# Patient Record
Sex: FEMALE | Race: WHITE | NOT HISPANIC OR LATINO | Employment: UNEMPLOYED | URBAN - METROPOLITAN AREA
[De-identification: names, ages, dates, MRNs, and addresses within clinical notes are randomized per-mention and may not be internally consistent; named-entity substitution may affect disease eponyms.]

---

## 2024-02-12 ENCOUNTER — LAB VISIT (OUTPATIENT)
Dept: LAB | Facility: HOSPITAL | Age: 59
End: 2024-02-12
Attending: PODIATRIST
Payer: COMMERCIAL

## 2024-02-12 ENCOUNTER — OFFICE VISIT (OUTPATIENT)
Dept: PODIATRY | Facility: CLINIC | Age: 59
End: 2024-02-12
Payer: COMMERCIAL

## 2024-02-12 VITALS
RESPIRATION RATE: 16 BRPM | WEIGHT: 169.19 LBS | BODY MASS INDEX: 29.98 KG/M2 | HEIGHT: 63 IN | SYSTOLIC BLOOD PRESSURE: 111 MMHG | DIASTOLIC BLOOD PRESSURE: 70 MMHG | HEART RATE: 91 BPM

## 2024-02-12 DIAGNOSIS — M25.879: Primary | ICD-10-CM

## 2024-02-12 DIAGNOSIS — Z01.818 PRE-OP EXAM: ICD-10-CM

## 2024-02-12 LAB
ALBUMIN SERPL BCP-MCNC: 3.7 G/DL (ref 3.5–5.2)
ALP SERPL-CCNC: 105 U/L (ref 55–135)
ALT SERPL W/O P-5'-P-CCNC: 46 U/L (ref 10–44)
ANION GAP SERPL CALC-SCNC: 10 MMOL/L (ref 8–16)
AST SERPL-CCNC: 37 U/L (ref 10–40)
BASOPHILS # BLD AUTO: 0.03 K/UL (ref 0–0.2)
BASOPHILS NFR BLD: 0.5 % (ref 0–1.9)
BILIRUB SERPL-MCNC: 0.2 MG/DL (ref 0.1–1)
BUN SERPL-MCNC: 23 MG/DL (ref 6–20)
CALCIUM SERPL-MCNC: 9.5 MG/DL (ref 8.7–10.5)
CHLORIDE SERPL-SCNC: 104 MMOL/L (ref 95–110)
CO2 SERPL-SCNC: 28 MMOL/L (ref 23–29)
CREAT SERPL-MCNC: 0.8 MG/DL (ref 0.5–1.4)
DIFFERENTIAL METHOD BLD: ABNORMAL
EOSINOPHIL # BLD AUTO: 0.1 K/UL (ref 0–0.5)
EOSINOPHIL NFR BLD: 1.5 % (ref 0–8)
ERYTHROCYTE [DISTWIDTH] IN BLOOD BY AUTOMATED COUNT: 13.2 % (ref 11.5–14.5)
EST. GFR  (NO RACE VARIABLE): >60 ML/MIN/1.73 M^2
GLUCOSE SERPL-MCNC: 90 MG/DL (ref 70–110)
HCT VFR BLD AUTO: 38.7 % (ref 37–48.5)
HGB BLD-MCNC: 12.6 G/DL (ref 12–16)
IMM GRANULOCYTES # BLD AUTO: 0.02 K/UL (ref 0–0.04)
IMM GRANULOCYTES NFR BLD AUTO: 0.3 % (ref 0–0.5)
LYMPHOCYTES # BLD AUTO: 2.2 K/UL (ref 1–4.8)
LYMPHOCYTES NFR BLD: 36.5 % (ref 18–48)
MCH RBC QN AUTO: 32.4 PG (ref 27–31)
MCHC RBC AUTO-ENTMCNC: 32.6 G/DL (ref 32–36)
MCV RBC AUTO: 100 FL (ref 82–98)
MONOCYTES # BLD AUTO: 0.3 K/UL (ref 0.3–1)
MONOCYTES NFR BLD: 5.2 % (ref 4–15)
NEUTROPHILS # BLD AUTO: 3.3 K/UL (ref 1.8–7.7)
NEUTROPHILS NFR BLD: 56 % (ref 38–73)
NRBC BLD-RTO: 0 /100 WBC
PLATELET # BLD AUTO: 212 K/UL (ref 150–450)
PMV BLD AUTO: 8.4 FL (ref 9.2–12.9)
POTASSIUM SERPL-SCNC: 4.1 MMOL/L (ref 3.5–5.1)
PROT SERPL-MCNC: 6.8 G/DL (ref 6–8.4)
RBC # BLD AUTO: 3.89 M/UL (ref 4–5.4)
SODIUM SERPL-SCNC: 142 MMOL/L (ref 136–145)
WBC # BLD AUTO: 5.92 K/UL (ref 3.9–12.7)

## 2024-02-12 PROCEDURE — 3008F BODY MASS INDEX DOCD: CPT | Mod: CPTII,S$GLB,, | Performed by: PODIATRIST

## 2024-02-12 PROCEDURE — 1159F MED LIST DOCD IN RCRD: CPT | Mod: CPTII,S$GLB,, | Performed by: PODIATRIST

## 2024-02-12 PROCEDURE — 1160F RVW MEDS BY RX/DR IN RCRD: CPT | Mod: CPTII,S$GLB,, | Performed by: PODIATRIST

## 2024-02-12 PROCEDURE — 36415 COLL VENOUS BLD VENIPUNCTURE: CPT | Mod: XB | Performed by: PODIATRIST

## 2024-02-12 PROCEDURE — 3074F SYST BP LT 130 MM HG: CPT | Mod: CPTII,S$GLB,, | Performed by: PODIATRIST

## 2024-02-12 PROCEDURE — 85025 COMPLETE CBC W/AUTO DIFF WBC: CPT | Performed by: PODIATRIST

## 2024-02-12 PROCEDURE — 99205 OFFICE O/P NEW HI 60 MIN: CPT | Mod: S$GLB,,, | Performed by: PODIATRIST

## 2024-02-12 PROCEDURE — 3078F DIAST BP <80 MM HG: CPT | Mod: CPTII,S$GLB,, | Performed by: PODIATRIST

## 2024-02-12 PROCEDURE — 80053 COMPREHEN METABOLIC PANEL: CPT | Performed by: PODIATRIST

## 2024-02-12 PROCEDURE — 99999 PR PBB SHADOW E&M-EST. PATIENT-LVL V: CPT | Mod: PBBFAC,,, | Performed by: PODIATRIST

## 2024-02-12 RX ORDER — CLONAZEPAM 0.5 MG/1
0.5 TABLET ORAL 2 TIMES DAILY PRN
COMMUNITY
End: 2024-02-12 | Stop reason: ALTCHOICE

## 2024-02-12 RX ORDER — AMOXICILLIN AND CLAVULANATE POTASSIUM 875; 125 MG/1; MG/1
TABLET, FILM COATED ORAL
COMMUNITY
Start: 2023-09-10 | End: 2024-02-12

## 2024-02-12 RX ORDER — DESVENLAFAXINE 100 MG/1
100 TABLET, EXTENDED RELEASE ORAL
COMMUNITY
End: 2024-05-22

## 2024-02-12 RX ORDER — DEXTROAMPHETAMINE SACCHARATE, AMPHETAMINE ASPARTATE MONOHYDRATE, DEXTROAMPHETAMINE SULFATE AND AMPHETAMINE SULFATE 5; 5; 5; 5 MG/1; MG/1; MG/1; MG/1
20 CAPSULE, EXTENDED RELEASE ORAL EVERY MORNING
COMMUNITY
End: 2024-02-12

## 2024-02-13 NOTE — PROGRESS NOTES
Subjective:       Patient ID: Kathy Hays is a 59 y.o. female.    Chief Complaint: Foot Problem, Foot Pain, Numbness, and Nail Problem  Patient presents today for a new patient evaluation she complains of foot pain bilateral including numbness of the right great toe and toenail fungus on the right foot.  Patient states the right foot is a lot worse than the left.  Patient states that she had developed a soft tissue mass initially on her right foot a proximally 30 years ago she states when she had seen a provider about the mass talked about having to go through the top of her foot to get to the mass which would cause nerve damage and did not recommend surgery at that time she had also seen another provider regarding her contracted digits on her right foot.  Patient states the 3 areas on her right foot have gotten progressively bigger she also has 1 area on the left.  Patient does have x-rays in her chart from 2023 right foot.  Patient indicates she had actually put off having the right foot addressed because of lack of insurance and she had been going through a divorce she recently has gotten insurance and states she needs to have this taking care of as it is significantly affecting her ability to ambulate and activity level.    Past Medical History:   Diagnosis Date    ADHD     Depression      Past Surgical History:   Procedure Laterality Date     SECTION      FOOT SURGERY Right     SINUS SURGERY       History reviewed. No pertinent family history.  Social History     Socioeconomic History    Marital status:    Tobacco Use    Smoking status: Never    Smokeless tobacco: Never   Substance and Sexual Activity    Alcohol use: Never    Drug use: Not Currently       Current Outpatient Medications   Medication Sig Dispense Refill    clonazePAM (KLONOPIN) 1 MG tablet Take 1 mg by mouth every evening.      desvenlafaxine succinate (PRISTIQ) 100 MG Tb24 Take 100 mg by  "mouth.       No current facility-administered medications for this visit.     Review of patient's allergies indicates:   Allergen Reactions    Codeine Anaphylaxis    Sulfa (sulfonamide antibiotics) Anaphylaxis       Review of Systems   Musculoskeletal:  Positive for arthralgias, gait problem and joint swelling.   Skin:  Positive for color change.   All other systems reviewed and are negative.      Objective:      Vitals:    02/12/24 1047   BP: 111/70   Pulse: 91   Resp: 16   Weight: 76.7 kg (169 lb 3.2 oz)   Height: 5' 3" (1.6 m)     Physical Exam  Vitals and nursing note reviewed. Exam conducted with a chaperone present.   Constitutional:       Appearance: Normal appearance.   Cardiovascular:      Pulses:           Dorsalis pedis pulses are 2+ on the right side and 2+ on the left side.        Posterior tibial pulses are 1+ on the right side and 1+ on the left side.   Pulmonary:      Effort: Pulmonary effort is normal.   Musculoskeletal:         General: Swelling, tenderness and deformity present.      Right foot: Decreased range of motion. Deformity and prominent metatarsal heads present.      Left foot: Deformity and prominent metatarsal heads present.        Feet:    Feet:      Right foot:      Protective Sensation: 4 sites tested.  2 sites sensed.      Skin integrity: Erythema and warmth present.      Toenail Condition: Right toenails are abnormally thick. Fungal disease present.     Left foot:      Protective Sensation: 4 sites tested.  3 sites sensed.      Skin integrity: Erythema present.   Skin:     Capillary Refill: Capillary refill takes 2 to 3 seconds.      Findings: Erythema present.   Neurological:      Mental Status: She is alert.      Sensory: Sensory deficit present.   Psychiatric:         Mood and Affect: Mood normal.         Behavior: Behavior normal.                        Assessment:       1. Mass of joint of foot    2. Pre-op exam        Plan:       Patient presents today for a new patient " evaluation she complains of foot pain bilateral including numbness of the right great toe and toenail fungus on the right foot.  Patient states the right foot is a lot worse than the left.  Patient states that she had developed a soft tissue mass initially on her right foot a proximally 30 years ago she states when she had seen a provider about the mass talked about having to go through the top of her foot to get to the mass which would cause nerve damage and did not recommend surgery at that time she had also seen another provider regarding her contracted digits on her right foot.  Patient states the 3 areas on her right foot have gotten progressively bigger she also has 1 area on the left.  Patient does have x-rays in her chart from December of 2023 right foot.  A comprehensive new patient evaluation was performed today patient was accompanied by her boyfriend.  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 patient has a smaller mass sub 1st MPJ right that is a proximally 3.5 cm long by 3 cm wide this is less firm it is movable most likely consistent with a lipoma an additional mass was present underlying the 2nd and 3rd metatarsal region proximal to the digits on the plantar aspect of the right foot this is a proximally 4 cm long by 3.5 cm wide it is also not firm it is softer in nature movable most likely consistent with a lipoma however all 3 masses could possibly be lipoma and or fibroma patient has a mass on the left foot proximal to the base of the 2nd and 3rd digits this mass is 3.5 cm long by 3 cm wide.  This mass is not firm it is movable most likely consistent with lipoma however fibroma can not be excluded.  Plain film x-rays were evaluated patient has what appears to be an old fracture of the 4th metatarsal with lateral deviation however it is completely healed there is  osteoporotic bone appreciated on plain film x-rays with diffuse degenerative changes right.  Patient indicated today she is very limited as to the shoes that she can wear she feels as if she needs arch support she does have elevated arches on both feet however when she wears an aggressive arch support it rubs and irritates the largest mass that is in the arch region on the right foot so she was wearing a very flat shoe today she also states she feels as if she has been compensating putting more pressure on her left foot because of the discomfort on the right patient has considerable numbness and loss of sensation in the right hallux most likely due to impingement of the soft tissue mass on the plantar aspect of the right foot.  Patient has fungally infected nails with dystrophy discoloration and thickening digits 1 through 5 right I have recommended the application of Vicks vapor rub twice a day every day advising the patient this will help to address the fungal involvement the pertinent fungal involvement on the right foot is not noted on the left.  Patient understands surgical excision of the soft tissue masses is likely not going to restore the loss of sensation and the loss of range of motion on the right foot as this is chronic in nature patient also understands this is an extensive surgical procedure with 3 likely plantar incisions which will require 4 weeks of complete nonweightbearing followed by several weeks of partial weight-bearing in a fracture boot before the patient will be able to transition to normal shoes with increasing activity.  Patient was in understanding and agreement with everything discussed clearly the right foot as much worse than the left and needs to be addressed initially we have tentatively schedule the patient's surgery for February the 23rd 2024 patient will be seen prior to this time for a preoperative history and physical she has been advised to contact us with any problems  questions or concerns prior to surgery patient understands she will need to utilize a knee scooter postoperatively to maintain her nonweightbearing status on the right foot.  I did advised the patient it has not very likely that any of these lesions or cancerous however they will be sent to pathology for further evaluation at the time of surgical excision.  Patient informed that she should contact us if she has any problems questions or concerns prior to her preoperative evaluation.  Preoperative lab work has been ordered will be reviewed prior to surgery.  Patient understands there has always a possibility of recurrence even with the surgical excision of the soft tissue mass x3.  X-ray evaluation today was performed on x-rays that were taken in December of 2023 new x-rays are not currently needed at this time as this is soft tissue related deformity.  Patient understands risks involved with this type of procedure.This note was created using [employer] voice recognition software that occasionally misinterpreted phrases or words.

## 2024-02-19 ENCOUNTER — TELEPHONE (OUTPATIENT)
Dept: PODIATRY | Facility: CLINIC | Age: 59
End: 2024-02-19
Payer: COMMERCIAL

## 2024-02-19 NOTE — TELEPHONE ENCOUNTER
LVM for patient to return call advising we need to know if she will be having surgery this week. Cancelled pre op appt over the weekend.

## 2024-05-02 ENCOUNTER — TELEPHONE (OUTPATIENT)
Dept: PODIATRY | Facility: CLINIC | Age: 59
End: 2024-05-02
Payer: COMMERCIAL

## 2024-05-02 NOTE — TELEPHONE ENCOUNTER
----- Message from Wilber Nicole DPM sent at 5/2/2024 12:17 PM CDT -----  Contact: PT  6/14 let me know if this works.  ----- Message -----  From: Lisa Haji LPN  Sent: 5/2/2024  12:00 PM CDT  To: Wilber Nicole DPM    Patient cancelled surgery in February and is now wanting to schedule surgery again. Please advise.  ----- Message -----  From: Korina Mota  Sent: 5/2/2024  11:29 AM CDT  To: [facility] Staff    Type: Needs Medical Advice    Who Called: PT  Best Call Back Number: [phone]  Additional  Information: PT requesting a call back to reschedule missed appt for EXCISION, MASS, FOOT. Would like to have it asap, Pt states she getting to where she can't walk and in a lot of pain.   Please advise- Thank you

## 2024-05-10 ENCOUNTER — TELEPHONE (OUTPATIENT)
Dept: PODIATRY | Facility: CLINIC | Age: 59
End: 2024-05-10
Payer: COMMERCIAL

## 2024-05-10 ENCOUNTER — ANESTHESIA EVENT (OUTPATIENT)
Dept: SURGERY | Facility: HOSPITAL | Age: 59
End: 2024-05-10
Payer: COMMERCIAL

## 2024-05-10 DIAGNOSIS — M25.879: Primary | ICD-10-CM

## 2024-05-10 DIAGNOSIS — R22.41 MASS OF RIGHT FOOT: ICD-10-CM

## 2024-05-10 RX ORDER — SODIUM CHLORIDE, SODIUM LACTATE, POTASSIUM CHLORIDE, CALCIUM CHLORIDE 600; 310; 30; 20 MG/100ML; MG/100ML; MG/100ML; MG/100ML
INJECTION, SOLUTION INTRAVENOUS CONTINUOUS
Status: CANCELLED | OUTPATIENT
Start: 2024-05-10

## 2024-05-10 RX ORDER — LIDOCAINE HYDROCHLORIDE 10 MG/ML
1 INJECTION, SOLUTION EPIDURAL; INFILTRATION; INTRACAUDAL; PERINEURAL ONCE
Status: CANCELLED | OUTPATIENT
Start: 2024-05-10 | End: 2024-05-10

## 2024-05-10 NOTE — TELEPHONE ENCOUNTER
----- Message from Sweetie Lomeli sent at 5/10/2024  8:24 AM CDT -----  Contact: PT  Type:  Patient Returning Call    Who Called:PT   Who Left Message for Patient:FELIX  Does the patient know what this is regarding?:SOONER APPT   Would the patient rather a call back or a response via [facility]? CALL   Best Call Back Number: TEMP #: [phone]  Additional Information: THANK YOU

## 2024-05-10 NOTE — ANESTHESIA PREPROCEDURE EVALUATION
05/10/2024  Kathy Hays is a 59 y.o., female.      Pre-op Assessment    I have reviewed the Patient Summary Reports.     I have reviewed the Nursing Notes. I have reviewed the NPO Status.   I have reviewed the Medications.     Review of Systems  Anesthesia Hx:  No problems with previous Anesthesia  C/S  FESS  R foot fx  Pilonidal  T&A              Social:  Non-Smoker       Hematology/Oncology:  Hematology Normal   Oncology Normal                                   EENT/Dental:  EENT/Dental Normal           Cardiovascular:  Cardiovascular Normal                                            Pulmonary:  Pulmonary Normal                       Renal/:  Renal/ Normal                 Hepatic/GI:  Hepatic/GI Normal       ALT 46  isolated elevated enzyme          Musculoskeletal:  Musculoskeletal Normal                Neurological:      Headaches     migraines                            Endocrine:  Endocrine Normal    A1C 5.2        Dermatological:  Skin Normal    Psych:   anxiety depression ADHD               Physical Exam  General: Well nourished, Cooperative, Alert and Oriented    Airway:  Mallampati: I   Mouth Opening: Normal  TM Distance: Normal  Neck ROM: Normal ROM    Dental:  Intact  Intact.  None loose per pt report  Chest/Lungs:  Clear to auscultation, Normal Respiratory Rate    Heart:  Rate: Normal  Rhythm: Regular Rhythm  Sounds: Normal        Anesthesia Plan  Type of Anesthesia, risks & benefits discussed:    Anesthesia Type: MAC, Gen Supraglottic Airway  Intra-op Monitoring Plan: Standard ASA Monitors  Post Op Pain Control Plan: multimodal analgesia  Induction:  IV  Airway Plan: Direct, Post-Induction  Informed Consent: Informed consent signed with the Patient and all parties understand the risks and agree with anesthesia plan.  All questions answered. Patient consented to  blood products? No  ASA Score: 2  Day of Surgery Review of History & Physical: I have interviewed and examined the patient. I have reviewed the patient's H&P dated: There are no significant changes.   Anesthesia Plan Notes: NPO after 2300-CL    Ready For Surgery From Anesthesia Perspective.     .

## 2024-05-13 ENCOUNTER — PATIENT MESSAGE (OUTPATIENT)
Dept: PODIATRY | Facility: CLINIC | Age: 59
End: 2024-05-13
Payer: COMMERCIAL

## 2024-05-21 ENCOUNTER — PATIENT MESSAGE (OUTPATIENT)
Dept: PODIATRY | Facility: CLINIC | Age: 59
End: 2024-05-21
Payer: COMMERCIAL

## 2024-05-22 ENCOUNTER — OFFICE VISIT (OUTPATIENT)
Dept: PODIATRY | Facility: CLINIC | Age: 59
End: 2024-05-22
Payer: COMMERCIAL

## 2024-05-22 VITALS — HEIGHT: 63 IN | BODY MASS INDEX: 29.95 KG/M2 | WEIGHT: 169.06 LBS

## 2024-05-22 DIAGNOSIS — M79.2 NEURITIS: ICD-10-CM

## 2024-05-22 DIAGNOSIS — M25.879: Primary | ICD-10-CM

## 2024-05-22 PROCEDURE — 1160F RVW MEDS BY RX/DR IN RCRD: CPT | Mod: CPTII,S$GLB,, | Performed by: PODIATRIST

## 2024-05-22 PROCEDURE — 1159F MED LIST DOCD IN RCRD: CPT | Mod: CPTII,S$GLB,, | Performed by: PODIATRIST

## 2024-05-22 PROCEDURE — 3008F BODY MASS INDEX DOCD: CPT | Mod: CPTII,S$GLB,, | Performed by: PODIATRIST

## 2024-05-22 PROCEDURE — 99214 OFFICE O/P EST MOD 30 MIN: CPT | Mod: 25,S$GLB,, | Performed by: PODIATRIST

## 2024-05-22 PROCEDURE — 96372 THER/PROPH/DIAG INJ SC/IM: CPT | Mod: S$GLB,,, | Performed by: PODIATRIST

## 2024-05-22 PROCEDURE — 99999 PR PBB SHADOW E&M-EST. PATIENT-LVL III: CPT | Mod: PBBFAC,,, | Performed by: PODIATRIST

## 2024-05-22 RX ORDER — BUPROPION HYDROCHLORIDE 150 MG/1
150 TABLET ORAL
COMMUNITY
Start: 2024-05-10 | End: 2024-06-10

## 2024-05-22 RX ORDER — TRIAMCINOLONE ACETONIDE 40 MG/ML
80 INJECTION, SUSPENSION INTRA-ARTICULAR; INTRAMUSCULAR
Status: COMPLETED | OUTPATIENT
Start: 2024-05-22 | End: 2024-05-22

## 2024-05-22 RX ORDER — KETOROLAC TROMETHAMINE 30 MG/ML
60 INJECTION, SOLUTION INTRAMUSCULAR; INTRAVENOUS
Status: COMPLETED | OUTPATIENT
Start: 2024-05-22 | End: 2024-05-22

## 2024-05-22 RX ORDER — METHYLPREDNISOLONE 4 MG/1
TABLET ORAL
Qty: 21 EACH | Refills: 0 | Status: SHIPPED | OUTPATIENT
Start: 2024-05-22 | End: 2024-06-10

## 2024-05-22 RX ORDER — MIRTAZAPINE 15 MG/1
TABLET, FILM COATED ORAL
COMMUNITY
Start: 2024-04-11 | End: 2024-06-10

## 2024-05-22 RX ADMIN — TRIAMCINOLONE ACETONIDE 80 MG: 40 INJECTION, SUSPENSION INTRA-ARTICULAR; INTRAMUSCULAR at 02:05

## 2024-05-22 RX ADMIN — KETOROLAC TROMETHAMINE 60 MG: 30 INJECTION, SOLUTION INTRAMUSCULAR; INTRAVENOUS at 02:05

## 2024-05-25 NOTE — PROGRESS NOTES
Subjective:       Patient ID: Kathy Hays is a 59 y.o. female.    Chief Complaint: Foot Pain (Right foot)  Patient presents today for a follow-up patient evaluation she complains of foot pain bilateral including numbness of the right great toe and toenail fungus on the right foot.  Patient states the right foot is a lot worse than the left.  Patient states that she had developed a soft tissue mass initially on her right foot a proximally 30 years ago she states when she had seen a provider about the mass talked about having to go through the top of her foot to get to the mass which would cause nerve damage and did not recommend surgery at that time she had also seen another provider regarding her contracted digits on her right foot.  Patient states the 3 areas on her right foot have gotten progressively bigger she also has 1 area on the left.  Patient does have x-rays in her chart from 2023 right foot.  Patient indicates she had actually put off having the right foot addressed because of lack of insurance and she had been going through a divorce she recently has gotten insurance and states she needs to have this taking care of as it is significantly affecting her ability to ambulate and activity level.    Past Medical History:   Diagnosis Date    ADHD     Depression      Past Surgical History:   Procedure Laterality Date     SECTION      FOOT SURGERY Right     SINUS SURGERY       No family history on file.  Social History     Socioeconomic History    Marital status:    Tobacco Use    Smoking status: Never    Smokeless tobacco: Never   Substance and Sexual Activity    Alcohol use: Never    Drug use: Not Currently   Social History Narrative    ** Merged History Encounter **            Current Outpatient Medications   Medication Sig Dispense Refill    buPROPion (WELLBUTRIN XL) 150 MG TB24 tablet Take 150 mg by mouth.      clonazePAM (KLONOPIN) 1 MG tablet Take 1  "mg by mouth every evening.      fluticasone propionate (FLONASE) 50 mcg/actuation nasal spray 1 spray (50 mcg total) by Each Nostril route once daily. 18 g 0    mirtazapine (REMERON) 15 MG tablet Take by mouth.      methylPREDNISolone (MEDROL DOSEPACK) 4 mg tablet use as directed 21 each 0     No current facility-administered medications for this visit.     Review of patient's allergies indicates:   Allergen Reactions    Codeine Anaphylaxis     Need clarification.  Anaphylaxis unlikely    Sulfa (sulfonamide antibiotics) Anaphylaxis     Need clarification.  Anaphylaxis unlikely    Hydrocodone-acetaminophen      Other reaction(s): Nausea       Review of Systems   Musculoskeletal:  Positive for arthralgias, gait problem and joint swelling.   Skin:  Positive for color change.   All other systems reviewed and are negative.      Objective:      Vitals:    05/22/24 1343   Weight: 76.7 kg (169 lb 1.5 oz)   Height: 5' 3" (1.6 m)     Physical Exam  Vitals and nursing note reviewed. Exam conducted with a chaperone present.   Constitutional:       Appearance: Normal appearance.   Cardiovascular:      Pulses:           Dorsalis pedis pulses are 2+ on the right side and 2+ on the left side.        Posterior tibial pulses are 1+ on the right side and 1+ on the left side.   Pulmonary:      Effort: Pulmonary effort is normal.   Musculoskeletal:         General: Swelling, tenderness and deformity present.      Right foot: Decreased range of motion. Deformity and prominent metatarsal heads present.      Left foot: Deformity and prominent metatarsal heads present.        Feet:    Feet:      Right foot:      Protective Sensation: 4 sites tested.  2 sites sensed.      Skin integrity: Erythema and warmth present.      Toenail Condition: Right toenails are abnormally thick. Fungal disease present.     Left foot:      Protective Sensation: 4 sites tested.  3 sites sensed.      Skin integrity: Erythema present.   Skin:     Capillary Refill: " Capillary refill takes 2 to 3 seconds.      Findings: Erythema present.   Neurological:      Mental Status: She is alert.      Sensory: Sensory deficit present.   Psychiatric:         Mood and Affect: Mood normal.         Behavior: Behavior normal.                                      Assessment:       1. Mass of joint of foot    2. Neuritis        Plan:       Patient presents today for a follow-up patient evaluation she complains of foot pain bilateral including numbness of the right great toe and toenail fungus on the right foot.  Patient states the right foot is a lot worse than the left.  Patient states that she had developed a soft tissue mass initially on her right foot a proximally 30 years ago she states when she had seen a provider about the mass talked about having to go through the top of her foot to get to the mass which would cause nerve damage and did not recommend surgery at that time she had also seen another provider regarding her contracted digits on her right foot.  Patient states the 3 areas on her right foot have gotten progressively bigger she also has 1 area on the left.  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 patient has a smaller mass sub 1st MPJ right that is a proximally 3.5 cm long by 3 cm wide this is less firm it is movable most likely consistent with a lipoma an additional mass was present underlying the 2nd and 3rd metatarsal region proximal to the digits on the plantar aspect of the right foot this is a proximally 4 cm long by 3.5 cm wide it is also not firm it is softer in nature movable most likely consistent with a lipoma however all 3 masses could possibly be lipoma and or fibroma patient has a mass on the left foot proximal to the base of the 2nd and 3rd digits this mass is 3.5 cm long by 3 cm wide.  This mass is not firm it is movable  most likely consistent with lipoma however fibroma can not be excluded.  Patient relates today she has been having a lot of problems she has been on her feet more she is in the process of moving she states this has caused a lot of discomfort a lot of shooting pain over the past month from her foot up her leg to the sciatic nerve area I did advised the patient it is very likely because she is not walking normally in his not able to have her right foot purchase the ground properly it is causing stress on other parts of her body including the right side of her body the increased activity is likely contributing to the nerve pain on the right side.  Patient states the pain is so severe she can barely get around it move.  Patient previously had surgery scheduled to have these soft tissue masses addressed on the plantar surface of the right foot she would subsequently cancel the surgery she has rescheduled her surgery for June and she states she intends to proceed with surgery as previously discussed she does understand there is no guarantee that removing these nodules is going to eliminate the nerve damage nerve pain that she is currently having just want make sure the patient understands this certainly removal of the nodules is going to be helpful but I want make sure the patient has reasonable expectations.  Patient understands she will be nonweightbearing a minimum of 4 weeks following this procedure.  I did recommend an IM injection of Kenalog right side IM injection of Ketoralac on the left side and I have started the patient on a Medrol Dosepak to help with generalized inflammation.  Patient is scheduled for a preoperative evaluation in the next several weeks.  Patient advised to contact us with any problems questions or concerns I did advised the patient she really needs to take it easy and pace herself she can not be on her feet too much otherwise this is going to get progressively worse leading up to her surgery.   This note was created using [employer] voice recognition software that occasionally misinterpreted phrases or words.

## 2024-05-27 ENCOUNTER — TELEPHONE (OUTPATIENT)
Dept: PODIATRY | Facility: CLINIC | Age: 59
End: 2024-05-27
Payer: COMMERCIAL

## 2024-06-10 ENCOUNTER — HOSPITAL ENCOUNTER (OUTPATIENT)
Dept: RADIOLOGY | Facility: HOSPITAL | Age: 59
Discharge: HOME | End: 2024-06-10
Payer: COMMERCIAL

## 2024-06-10 ENCOUNTER — OFFICE VISIT (OUTPATIENT)
Dept: PODIATRY | Facility: CLINIC | Age: 59
End: 2024-06-10
Payer: COMMERCIAL

## 2024-06-10 ENCOUNTER — HOSPITAL ENCOUNTER (OUTPATIENT)
Dept: RADIOLOGY | Facility: HOSPITAL | Age: 59
Discharge: HOME | End: 2024-06-10
Attending: PODIATRIST
Payer: COMMERCIAL

## 2024-06-10 VITALS
HEIGHT: 63 IN | HEART RATE: 76 BPM | DIASTOLIC BLOOD PRESSURE: 81 MMHG | BODY MASS INDEX: 29.23 KG/M2 | WEIGHT: 165 LBS | SYSTOLIC BLOOD PRESSURE: 122 MMHG

## 2024-06-10 DIAGNOSIS — M79.2 NEURITIS: ICD-10-CM

## 2024-06-10 DIAGNOSIS — Z12.31 ENCOUNTER FOR SCREENING MAMMOGRAM FOR BREAST CANCER: ICD-10-CM

## 2024-06-10 DIAGNOSIS — M25.879: ICD-10-CM

## 2024-06-10 DIAGNOSIS — M25.879: Primary | ICD-10-CM

## 2024-06-10 PROCEDURE — 3079F DIAST BP 80-89 MM HG: CPT | Mod: CPTII,S$GLB,, | Performed by: PODIATRIST

## 2024-06-10 PROCEDURE — 73630 X-RAY EXAM OF FOOT: CPT | Mod: TC,RT

## 2024-06-10 PROCEDURE — 3074F SYST BP LT 130 MM HG: CPT | Mod: CPTII,S$GLB,, | Performed by: PODIATRIST

## 2024-06-10 PROCEDURE — 3008F BODY MASS INDEX DOCD: CPT | Mod: CPTII,S$GLB,, | Performed by: PODIATRIST

## 2024-06-10 PROCEDURE — 99999 PR PBB SHADOW E&M-EST. PATIENT-LVL IV: CPT | Mod: PBBFAC,,, | Performed by: PODIATRIST

## 2024-06-10 PROCEDURE — 99215 OFFICE O/P EST HI 40 MIN: CPT | Mod: 57,S$GLB,, | Performed by: PODIATRIST

## 2024-06-10 PROCEDURE — 1159F MED LIST DOCD IN RCRD: CPT | Mod: CPTII,S$GLB,, | Performed by: PODIATRIST

## 2024-06-10 PROCEDURE — 77067 SCR MAMMO BI INCL CAD: CPT | Mod: 26,,, | Performed by: RADIOLOGY

## 2024-06-10 PROCEDURE — 73630 X-RAY EXAM OF FOOT: CPT | Mod: 26,RT,, | Performed by: RADIOLOGY

## 2024-06-10 PROCEDURE — 77063 BREAST TOMOSYNTHESIS BI: CPT | Mod: 26,,, | Performed by: RADIOLOGY

## 2024-06-10 PROCEDURE — 1160F RVW MEDS BY RX/DR IN RCRD: CPT | Mod: CPTII,S$GLB,, | Performed by: PODIATRIST

## 2024-06-10 PROCEDURE — 77063 BREAST TOMOSYNTHESIS BI: CPT | Mod: TC

## 2024-06-10 RX ORDER — ONDANSETRON HYDROCHLORIDE 8 MG/1
8 TABLET, FILM COATED ORAL EVERY 8 HOURS PRN
Qty: 42 TABLET | Refills: 1 | Status: SHIPPED | OUTPATIENT
Start: 2024-06-10 | End: 2024-07-08

## 2024-06-10 RX ORDER — TRAMADOL HYDROCHLORIDE 50 MG/1
50 TABLET ORAL EVERY 6 HOURS PRN
Qty: 20 TABLET | Refills: 0 | Status: SHIPPED | OUTPATIENT
Start: 2024-06-10 | End: 2024-06-10 | Stop reason: SDUPTHER

## 2024-06-10 RX ORDER — LIDOCAINE 50 MG/G
1 PATCH TOPICAL
COMMUNITY
Start: 2024-05-25 | End: 2024-06-12

## 2024-06-10 RX ORDER — DEXTROAMPHETAMINE SACCHARATE, AMPHETAMINE ASPARTATE, DEXTROAMPHETAMINE SULFATE AND AMPHETAMINE SULFATE 3.75; 3.75; 3.75; 3.75 MG/1; MG/1; MG/1; MG/1
TABLET ORAL
COMMUNITY
Start: 2024-05-31 | End: 2024-06-12

## 2024-06-10 RX ORDER — METHOCARBAMOL 500 MG/1
500 TABLET, FILM COATED ORAL
COMMUNITY
Start: 2024-05-25 | End: 2024-06-12

## 2024-06-10 RX ORDER — DOXYCYCLINE 100 MG/1
100 CAPSULE ORAL EVERY 12 HOURS
Qty: 28 CAPSULE | Refills: 0 | Status: SHIPPED | OUTPATIENT
Start: 2024-06-10 | End: 2024-06-24

## 2024-06-10 RX ORDER — DOXYCYCLINE 100 MG/1
100 CAPSULE ORAL EVERY 12 HOURS
Qty: 28 CAPSULE | Refills: 0 | Status: SHIPPED | OUTPATIENT
Start: 2024-06-10 | End: 2024-06-10 | Stop reason: SDUPTHER

## 2024-06-10 RX ORDER — ONDANSETRON HYDROCHLORIDE 8 MG/1
8 TABLET, FILM COATED ORAL EVERY 8 HOURS PRN
Qty: 42 TABLET | Refills: 1 | Status: SHIPPED | OUTPATIENT
Start: 2024-06-10 | End: 2024-06-10 | Stop reason: SDUPTHER

## 2024-06-10 RX ORDER — TRAMADOL HYDROCHLORIDE 50 MG/1
50 TABLET ORAL EVERY 6 HOURS PRN
Qty: 20 TABLET | Refills: 0 | Status: SHIPPED | OUTPATIENT
Start: 2024-06-10 | End: 2024-06-15

## 2024-06-11 PROBLEM — M79.2 NEURITIS: Status: ACTIVE | Noted: 2024-06-11

## 2024-06-11 RX ORDER — SODIUM CHLORIDE, SODIUM LACTATE, POTASSIUM CHLORIDE, CALCIUM CHLORIDE 600; 310; 30; 20 MG/100ML; MG/100ML; MG/100ML; MG/100ML
INJECTION, SOLUTION INTRAVENOUS CONTINUOUS
Status: CANCELLED | OUTPATIENT
Start: 2024-06-14

## 2024-06-11 NOTE — H&P (VIEW-ONLY)
Subjective:       Patient ID: Ariel Hays is a 59 y.o. female.    Chief Complaint: Pre-op Exam (Right foot)  Patient presents today for a follow-up patient evaluation she complains of foot pain bilateral including numbness of the right great toe and toenail fungus on the right foot.  Patient states the right foot is a lot worse than the left.  Patient states that she had developed a soft tissue mass initially on her right foot a proximally 30 years ago she states when she had seen a provider about the mass talked about having to go through the top of her foot to get to the mass which would cause nerve damage and did not recommend surgery at that time she had also seen another provider regarding her contracted digits on her right foot.  Patient states the 3 areas on her right foot have gotten progressively bigger she also has 1 area on the left.  Patient presents today for further surgical consultation regarding excision of these 3 large soft tissue masses on the plantar surface of the right foot.    Past Medical History:   Diagnosis Date    ADHD     Depression      Past Surgical History:   Procedure Laterality Date     SECTION      FOOT SURGERY Right     SINUS SURGERY       No family history on file.  Social History     Socioeconomic History    Marital status:    Tobacco Use    Smoking status: Never    Smokeless tobacco: Never   Substance and Sexual Activity    Alcohol use: Never    Drug use: Not Currently   Social History Narrative    ** Merged History Encounter **            Current Outpatient Medications   Medication Sig Dispense Refill    clonazePAM (KLONOPIN) 1 MG tablet Take 1 mg by mouth every evening.      dextroamphetamine-amphetamine (ADDERALL) 15 mg tablet       LIDOcaine (LIDODERM) 5 % Place 1 patch onto the skin.      methocarbamoL (ROBAXIN) 500 MG Tab Take 500 mg by mouth.      doxycycline (VIBRAMYCIN) 100 MG Cap Take 1 capsule (100 mg total) by mouth every 12 (twelve)  "hours. for 14 days 28 capsule 0    ondansetron (ZOFRAN) 8 MG tablet Take 1 tablet (8 mg total) by mouth every 8 (eight) hours as needed for Nausea. 42 tablet 1    traMADoL (ULTRAM) 50 mg tablet Take 1 tablet (50 mg total) by mouth every 6 (six) hours as needed for Pain. 20 tablet 0     No current facility-administered medications for this visit.     Review of patient's allergies indicates:   Allergen Reactions    Codeine Anaphylaxis     Need clarification.  Anaphylaxis unlikely    Sulfa (sulfonamide antibiotics) Anaphylaxis     Need clarification.  Anaphylaxis unlikely    Hydrocodone-acetaminophen      Other reaction(s): Nausea       Review of Systems   Musculoskeletal:  Positive for arthralgias, gait problem and joint swelling.   Skin:  Positive for color change.   All other systems reviewed and are negative.      Objective:      Vitals:    06/10/24 1108   BP: 122/81   BP Location: Right arm   Patient Position: Sitting   Pulse: 76   Weight: 74.8 kg (165 lb)   Height: 5' 3" (1.6 m)     Physical Exam  Vitals and nursing note reviewed. Exam conducted with a chaperone present.   Constitutional:       Appearance: Normal appearance.   Cardiovascular:      Rate and Rhythm: Normal rate and regular rhythm.      Pulses:           Dorsalis pedis pulses are 2+ on the right side and 2+ on the left side.        Posterior tibial pulses are 1+ on the right side and 1+ on the left side.      Heart sounds: Normal heart sounds.   Pulmonary:      Effort: Pulmonary effort is normal.      Breath sounds: Normal breath sounds.   Musculoskeletal:         General: Swelling, tenderness and deformity present.      Right foot: Decreased range of motion. Deformity and prominent metatarsal heads present.      Left foot: Deformity and prominent metatarsal heads present.        Feet:    Feet:      Right foot:      Protective Sensation: 4 sites tested.  2 sites sensed.      Skin integrity: Erythema and warmth present.      Toenail Condition: Right " toenails are abnormally thick. Fungal disease present.     Left foot:      Protective Sensation: 4 sites tested.  3 sites sensed.      Skin integrity: Erythema present.   Skin:     Capillary Refill: Capillary refill takes 2 to 3 seconds.      Findings: Erythema present.   Neurological:      Mental Status: She is alert.      Sensory: Sensory deficit present.   Psychiatric:         Mood and Affect: Mood normal.         Behavior: Behavior normal.                                                    Assessment:       1. Mass of joint of foot    2. Neuritis        Plan:       Patient presents today for a follow-up patient evaluation she complains of foot pain bilateral including numbness of the right great toe and toenail fungus on the right foot.  Patient states the right foot is a lot worse than the left.  Patient states that she had developed a soft tissue mass initially on her right foot a proximally 30 years ago she states when she had seen a provider about the mass talked about having to go through the top of her foot to get to the mass which would cause nerve damage and did not recommend surgery at that time she had also seen another provider regarding her contracted digits on her right foot.  Patient states the 3 areas on her right foot have gotten progressively bigger she also has 1 area on the left. Patient presents today for further surgical consultation regarding excision of these 3 large soft tissue masses on the plantar surface of the right foot. 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 patient has a smaller mass sub 1st MPJ right that is a proximally 3.5 cm long by 3.5 cm wide this is less firm it is movable most likely consistent with a lipoma an additional mass was present underlying the 2nd and 3rd metatarsal region proximal to the digits on the plantar aspect of the  right foot this is a proximally 4 cm long by 3.5 cm wide it is also not firm it is softer in nature movable most likely consistent with a lipoma however all 3 masses could possibly be lipoma and or fibroma patient has a mass on the left foot proximal to the base of the 2nd and 3rd digits this mass is 3.5 cm long by 3 cm wide.  This mass is not firm it is movable most likely consistent with lipoma however fibroma can not be excluded.  Patient states she has been having a lot of problems obviously the soft tissue masses on the plantar surface of the right foot have affected the patient's gait the way she patrick around the mechanics of her foot she has been experiencing some discomfort down the entire right lower extremity because she has compensating the way she walks and has also been experiencing some knee and hip as well as sciatic pain.  Patient has nerve related pain in the right foot she was made aware there has no guarantee that has surgically removing the soft tissue masses will alleviate the nerve related pain and discomfort as this may be chronic nerve damage that is not reversible by removing the soft tissue mass.  Patient was in understanding and agreement with everything discussed patient may decision to pursue surgery as discussed.Patient presents today for preoperative history and physical in discussion all aspects of surgery were discussed with the patient no guarantees were given written or implied all potential complications including but not limited to delayed healing nonhealing postoperative pain infection recurrence were discussed with the patient in detail. All aspect of the patient's recovery time involved in recovery patient responsibility is involving recovery were also discussed in detail. Patient advised failure to comply with postoperative care will jeopardize surgical outcome.  All aspects of surgical intervention were discussed to remove the 3 large soft tissue masses on the plantar surface  of the patient's right foot patient advised and understands that she will be nonweightbearing a minimum of 4 weeks followed by several weeks of partial weight-bearing in a fracture boot before gradual return to activity she was made aware that putting any weight at all on the right foot during the postoperative course can cause painful scar tissue and could certainly lead to nonhealing or delayed healing.  Patient states she is not going to get a knee scooter she plans on using crutches postoperatively although I would strongly encouraged her to use a knee scooter I think this would allow her to get around a lot better but the patient indicates that she wants to use crutches.  Patient did indicate the steroid injections that were given to her on her last visit for severe right lower extremity pain definitely helped she has definitely a lot more comfortable today than she had been and her nerve symptoms have been reduced considerably.  Patient does have an allergy to all codeine based medications including synthetic codeine she has taken tramadol in the past I gave her a prescription for tramadol for her postoperative pain she will also take a leave over-the-counter to help with postoperative inflammation but she understands that ice and elevation is the most important form of pain control postoperatively.  Patient was given prescriptions for doxycycline and Zofran to take as directed postoperatively patient understands that she will be in a posterior splint for 4 weeks.  X-rays were reviewed today while the patient does have some degenerative changes no significant bone abnormality fracture or signs of dislocation appreciated.  Patient was made aware this soft tissue masses excised will be sent to pathology for evaluation.  Patient's surgery has been scheduled for June 14, 2024 she has been advised to contact us with any problems questions or concerns prior to surgery the patient will be NPO after midnight prior to  surgery.  All preoperative lab work and testing will be ordered as deemed appropriate by Anest[facility].  Total face-to-face time including discussion evaluation treatment discussion of treatment options treatment plan surgical consultation decision for surgery preoperative history and physical preoperative documentation documentation of today's visit chart review and preoperative orders including x-ray review today with the patient equaled 45 minutes.  I did stress with the patient the importance of postoperative compliance for adequate healing patient was in understanding and agreement with this sent consent form was signed today.  This note was created using [employer] voice recognition software that occasionally misinterpreted phrases or words.

## 2024-06-11 NOTE — PROGRESS NOTES
Subjective:       Patient ID: Ariel Hays is a 59 y.o. female.    Chief Complaint: Pre-op Exam (Right foot)  Patient presents today for a follow-up patient evaluation she complains of foot pain bilateral including numbness of the right great toe and toenail fungus on the right foot.  Patient states the right foot is a lot worse than the left.  Patient states that she had developed a soft tissue mass initially on her right foot a proximally 30 years ago she states when she had seen a provider about the mass talked about having to go through the top of her foot to get to the mass which would cause nerve damage and did not recommend surgery at that time she had also seen another provider regarding her contracted digits on her right foot.  Patient states the 3 areas on her right foot have gotten progressively bigger she also has 1 area on the left.  Patient presents today for further surgical consultation regarding excision of these 3 large soft tissue masses on the plantar surface of the right foot.    Past Medical History:   Diagnosis Date    ADHD     Depression      Past Surgical History:   Procedure Laterality Date     SECTION      FOOT SURGERY Right     SINUS SURGERY       No family history on file.  Social History     Socioeconomic History    Marital status:    Tobacco Use    Smoking status: Never    Smokeless tobacco: Never   Substance and Sexual Activity    Alcohol use: Never    Drug use: Not Currently   Social History Narrative    ** Merged History Encounter **            Current Outpatient Medications   Medication Sig Dispense Refill    clonazePAM (KLONOPIN) 1 MG tablet Take 1 mg by mouth every evening.      dextroamphetamine-amphetamine (ADDERALL) 15 mg tablet       LIDOcaine (LIDODERM) 5 % Place 1 patch onto the skin.      methocarbamoL (ROBAXIN) 500 MG Tab Take 500 mg by mouth.      doxycycline (VIBRAMYCIN) 100 MG Cap Take 1 capsule (100 mg total) by mouth every 12 (twelve)  "hours. for 14 days 28 capsule 0    ondansetron (ZOFRAN) 8 MG tablet Take 1 tablet (8 mg total) by mouth every 8 (eight) hours as needed for Nausea. 42 tablet 1    traMADoL (ULTRAM) 50 mg tablet Take 1 tablet (50 mg total) by mouth every 6 (six) hours as needed for Pain. 20 tablet 0     No current facility-administered medications for this visit.     Review of patient's allergies indicates:   Allergen Reactions    Codeine Anaphylaxis     Need clarification.  Anaphylaxis unlikely    Sulfa (sulfonamide antibiotics) Anaphylaxis     Need clarification.  Anaphylaxis unlikely    Hydrocodone-acetaminophen      Other reaction(s): Nausea       Review of Systems   Musculoskeletal:  Positive for arthralgias, gait problem and joint swelling.   Skin:  Positive for color change.   All other systems reviewed and are negative.      Objective:      Vitals:    06/10/24 1108   BP: 122/81   BP Location: Right arm   Patient Position: Sitting   Pulse: 76   Weight: 74.8 kg (165 lb)   Height: 5' 3" (1.6 m)     Physical Exam  Vitals and nursing note reviewed. Exam conducted with a chaperone present.   Constitutional:       Appearance: Normal appearance.   Cardiovascular:      Rate and Rhythm: Normal rate and regular rhythm.      Pulses:           Dorsalis pedis pulses are 2+ on the right side and 2+ on the left side.        Posterior tibial pulses are 1+ on the right side and 1+ on the left side.      Heart sounds: Normal heart sounds.   Pulmonary:      Effort: Pulmonary effort is normal.      Breath sounds: Normal breath sounds.   Musculoskeletal:         General: Swelling, tenderness and deformity present.      Right foot: Decreased range of motion. Deformity and prominent metatarsal heads present.      Left foot: Deformity and prominent metatarsal heads present.        Feet:    Feet:      Right foot:      Protective Sensation: 4 sites tested.  2 sites sensed.      Skin integrity: Erythema and warmth present.      Toenail Condition: Right " toenails are abnormally thick. Fungal disease present.     Left foot:      Protective Sensation: 4 sites tested.  3 sites sensed.      Skin integrity: Erythema present.   Skin:     Capillary Refill: Capillary refill takes 2 to 3 seconds.      Findings: Erythema present.   Neurological:      Mental Status: She is alert.      Sensory: Sensory deficit present.   Psychiatric:         Mood and Affect: Mood normal.         Behavior: Behavior normal.                                                    Assessment:       1. Mass of joint of foot    2. Neuritis        Plan:       Patient presents today for a follow-up patient evaluation she complains of foot pain bilateral including numbness of the right great toe and toenail fungus on the right foot.  Patient states the right foot is a lot worse than the left.  Patient states that she had developed a soft tissue mass initially on her right foot a proximally 30 years ago she states when she had seen a provider about the mass talked about having to go through the top of her foot to get to the mass which would cause nerve damage and did not recommend surgery at that time she had also seen another provider regarding her contracted digits on her right foot.  Patient states the 3 areas on her right foot have gotten progressively bigger she also has 1 area on the left. Patient presents today for further surgical consultation regarding excision of these 3 large soft tissue masses on the plantar surface of the right foot. 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 patient has a smaller mass sub 1st MPJ right that is a proximally 3.5 cm long by 3.5 cm wide this is less firm it is movable most likely consistent with a lipoma an additional mass was present underlying the 2nd and 3rd metatarsal region proximal to the digits on the plantar aspect of the  right foot this is a proximally 4 cm long by 3.5 cm wide it is also not firm it is softer in nature movable most likely consistent with a lipoma however all 3 masses could possibly be lipoma and or fibroma patient has a mass on the left foot proximal to the base of the 2nd and 3rd digits this mass is 3.5 cm long by 3 cm wide.  This mass is not firm it is movable most likely consistent with lipoma however fibroma can not be excluded.  Patient states she has been having a lot of problems obviously the soft tissue masses on the plantar surface of the right foot have affected the patient's gait the way she patrick around the mechanics of her foot she has been experiencing some discomfort down the entire right lower extremity because she has compensating the way she walks and has also been experiencing some knee and hip as well as sciatic pain.  Patient has nerve related pain in the right foot she was made aware there has no guarantee that has surgically removing the soft tissue masses will alleviate the nerve related pain and discomfort as this may be chronic nerve damage that is not reversible by removing the soft tissue mass.  Patient was in understanding and agreement with everything discussed patient may decision to pursue surgery as discussed.Patient presents today for preoperative history and physical in discussion all aspects of surgery were discussed with the patient no guarantees were given written or implied all potential complications including but not limited to delayed healing nonhealing postoperative pain infection recurrence were discussed with the patient in detail. All aspect of the patient's recovery time involved in recovery patient responsibility is involving recovery were also discussed in detail. Patient advised failure to comply with postoperative care will jeopardize surgical outcome.  All aspects of surgical intervention were discussed to remove the 3 large soft tissue masses on the plantar surface  of the patient's right foot patient advised and understands that she will be nonweightbearing a minimum of 4 weeks followed by several weeks of partial weight-bearing in a fracture boot before gradual return to activity she was made aware that putting any weight at all on the right foot during the postoperative course can cause painful scar tissue and could certainly lead to nonhealing or delayed healing.  Patient states she is not going to get a knee scooter she plans on using crutches postoperatively although I would strongly encouraged her to use a knee scooter I think this would allow her to get around a lot better but the patient indicates that she wants to use crutches.  Patient did indicate the steroid injections that were given to her on her last visit for severe right lower extremity pain definitely helped she has definitely a lot more comfortable today than she had been and her nerve symptoms have been reduced considerably.  Patient does have an allergy to all codeine based medications including synthetic codeine she has taken tramadol in the past I gave her a prescription for tramadol for her postoperative pain she will also take a leave over-the-counter to help with postoperative inflammation but she understands that ice and elevation is the most important form of pain control postoperatively.  Patient was given prescriptions for doxycycline and Zofran to take as directed postoperatively patient understands that she will be in a posterior splint for 4 weeks.  X-rays were reviewed today while the patient does have some degenerative changes no significant bone abnormality fracture or signs of dislocation appreciated.  Patient was made aware this soft tissue masses excised will be sent to pathology for evaluation.  Patient's surgery has been scheduled for June 14, 2024 she has been advised to contact us with any problems questions or concerns prior to surgery the patient will be NPO after midnight prior to  surgery.  All preoperative lab work and testing will be ordered as deemed appropriate by Anesthesia.  Total face-to-face time including discussion evaluation treatment discussion of treatment options treatment plan surgical consultation decision for surgery preoperative history and physical preoperative documentation documentation of today's visit chart review and preoperative orders including x-ray review today with the patient equaled 45 minutes.  I did stress with the patient the importance of postoperative compliance for adequate healing patient was in understanding and agreement with this sent consent form was signed today.  This note was created using [employer] voice recognition software that occasionally misinterpreted phrases or words.

## 2024-06-12 ENCOUNTER — LAB VISIT (OUTPATIENT)
Dept: LAB | Facility: CLINIC | Age: 59
End: 2024-06-12
Payer: COMMERCIAL

## 2024-06-12 ENCOUNTER — OFFICE VISIT (OUTPATIENT)
Dept: FAMILY MEDICINE | Facility: CLINIC | Age: 59
End: 2024-06-12
Payer: COMMERCIAL

## 2024-06-12 VITALS
SYSTOLIC BLOOD PRESSURE: 138 MMHG | WEIGHT: 167 LBS | RESPIRATION RATE: 18 BRPM | HEIGHT: 63 IN | BODY MASS INDEX: 29.59 KG/M2 | OXYGEN SATURATION: 97 % | HEART RATE: 94 BPM | DIASTOLIC BLOOD PRESSURE: 80 MMHG

## 2024-06-12 DIAGNOSIS — G43.109 MIGRAINE WITH AURA AND WITHOUT STATUS MIGRAINOSUS, NOT INTRACTABLE: ICD-10-CM

## 2024-06-12 DIAGNOSIS — Z76.89 ENCOUNTER TO ESTABLISH CARE: ICD-10-CM

## 2024-06-12 DIAGNOSIS — F33.1 MODERATE EPISODE OF RECURRENT MAJOR DEPRESSIVE DISORDER: ICD-10-CM

## 2024-06-12 DIAGNOSIS — Z76.89 ENCOUNTER TO ESTABLISH CARE: Primary | ICD-10-CM

## 2024-06-12 DIAGNOSIS — Z12.11 COLON CANCER SCREENING: ICD-10-CM

## 2024-06-12 DIAGNOSIS — F90.0 ATTENTION DEFICIT HYPERACTIVITY DISORDER (ADHD), PREDOMINANTLY INATTENTIVE TYPE: ICD-10-CM

## 2024-06-12 PROBLEM — F32.1 CURRENT MODERATE EPISODE OF MAJOR DEPRESSIVE DISORDER: Status: ACTIVE | Noted: 2024-06-12

## 2024-06-12 LAB
ALBUMIN SERPL BCP-MCNC: 3.8 G/DL (ref 3.5–5.2)
ALP SERPL-CCNC: 68 U/L (ref 55–135)
ALT SERPL W/O P-5'-P-CCNC: 20 U/L (ref 10–44)
ANION GAP SERPL CALC-SCNC: 8 MMOL/L (ref 8–16)
AST SERPL-CCNC: 22 U/L (ref 10–40)
BASOPHILS # BLD AUTO: 0.03 K/UL (ref 0–0.2)
BASOPHILS NFR BLD: 0.5 % (ref 0–1.9)
BILIRUB SERPL-MCNC: 0.3 MG/DL (ref 0.1–1)
BUN SERPL-MCNC: 16 MG/DL (ref 6–20)
CALCIUM SERPL-MCNC: 9.6 MG/DL (ref 8.7–10.5)
CHLORIDE SERPL-SCNC: 105 MMOL/L (ref 95–110)
CHOLEST SERPL-MCNC: 268 MG/DL (ref 120–199)
CHOLEST/HDLC SERPL: 2.9 {RATIO} (ref 2–5)
CO2 SERPL-SCNC: 27 MMOL/L (ref 23–29)
CREAT SERPL-MCNC: 1.1 MG/DL (ref 0.5–1.4)
DIFFERENTIAL METHOD BLD: ABNORMAL
EOSINOPHIL # BLD AUTO: 0.1 K/UL (ref 0–0.5)
EOSINOPHIL NFR BLD: 1.1 % (ref 0–8)
ERYTHROCYTE [DISTWIDTH] IN BLOOD BY AUTOMATED COUNT: 13.3 % (ref 11.5–14.5)
EST. GFR  (NO RACE VARIABLE): 57.5 ML/MIN/1.73 M^2
ESTIMATED AVG GLUCOSE: 103 MG/DL (ref 68–131)
GLUCOSE SERPL-MCNC: 98 MG/DL (ref 70–110)
HBA1C MFR BLD: 5.2 % (ref 4–5.6)
HCT VFR BLD AUTO: 41.8 % (ref 37–48.5)
HDLC SERPL-MCNC: 91 MG/DL (ref 40–75)
HDLC SERPL: 34 % (ref 20–50)
HGB BLD-MCNC: 13.6 G/DL (ref 12–16)
IMM GRANULOCYTES # BLD AUTO: 0.02 K/UL (ref 0–0.04)
IMM GRANULOCYTES NFR BLD AUTO: 0.3 % (ref 0–0.5)
LDLC SERPL CALC-MCNC: 163.6 MG/DL (ref 63–159)
LYMPHOCYTES # BLD AUTO: 2.4 K/UL (ref 1–4.8)
LYMPHOCYTES NFR BLD: 36.6 % (ref 18–48)
MCH RBC QN AUTO: 31.9 PG (ref 27–31)
MCHC RBC AUTO-ENTMCNC: 32.5 G/DL (ref 32–36)
MCV RBC AUTO: 98 FL (ref 82–98)
MONOCYTES # BLD AUTO: 0.4 K/UL (ref 0.3–1)
MONOCYTES NFR BLD: 5.8 % (ref 4–15)
NEUTROPHILS # BLD AUTO: 3.6 K/UL (ref 1.8–7.7)
NEUTROPHILS NFR BLD: 55.7 % (ref 38–73)
NONHDLC SERPL-MCNC: 177 MG/DL
NRBC BLD-RTO: 0 /100 WBC
PLATELET # BLD AUTO: 184 K/UL (ref 150–450)
PMV BLD AUTO: 8.5 FL (ref 9.2–12.9)
POTASSIUM SERPL-SCNC: 3.9 MMOL/L (ref 3.5–5.1)
PROT SERPL-MCNC: 7.1 G/DL (ref 6–8.4)
RBC # BLD AUTO: 4.26 M/UL (ref 4–5.4)
SODIUM SERPL-SCNC: 140 MMOL/L (ref 136–145)
T4 FREE SERPL-MCNC: 0.86 NG/DL (ref 0.71–1.51)
TRIGL SERPL-MCNC: 67 MG/DL (ref 30–150)
TSH SERPL DL<=0.005 MIU/L-ACNC: 4.74 UIU/ML (ref 0.4–4)
WBC # BLD AUTO: 6.53 K/UL (ref 3.9–12.7)

## 2024-06-12 PROCEDURE — 80061 LIPID PANEL: CPT | Performed by: STUDENT IN AN ORGANIZED HEALTH CARE EDUCATION/TRAINING PROGRAM

## 2024-06-12 PROCEDURE — 36415 COLL VENOUS BLD VENIPUNCTURE: CPT | Mod: ,,, | Performed by: STUDENT IN AN ORGANIZED HEALTH CARE EDUCATION/TRAINING PROGRAM

## 2024-06-12 PROCEDURE — 85025 COMPLETE CBC W/AUTO DIFF WBC: CPT | Performed by: STUDENT IN AN ORGANIZED HEALTH CARE EDUCATION/TRAINING PROGRAM

## 2024-06-12 PROCEDURE — 83036 HEMOGLOBIN GLYCOSYLATED A1C: CPT | Performed by: STUDENT IN AN ORGANIZED HEALTH CARE EDUCATION/TRAINING PROGRAM

## 2024-06-12 PROCEDURE — 84443 ASSAY THYROID STIM HORMONE: CPT | Performed by: STUDENT IN AN ORGANIZED HEALTH CARE EDUCATION/TRAINING PROGRAM

## 2024-06-12 PROCEDURE — 1160F RVW MEDS BY RX/DR IN RCRD: CPT | Mod: CPTII,S$GLB,, | Performed by: STUDENT IN AN ORGANIZED HEALTH CARE EDUCATION/TRAINING PROGRAM

## 2024-06-12 PROCEDURE — 1159F MED LIST DOCD IN RCRD: CPT | Mod: CPTII,S$GLB,, | Performed by: STUDENT IN AN ORGANIZED HEALTH CARE EDUCATION/TRAINING PROGRAM

## 2024-06-12 PROCEDURE — 3008F BODY MASS INDEX DOCD: CPT | Mod: CPTII,S$GLB,, | Performed by: STUDENT IN AN ORGANIZED HEALTH CARE EDUCATION/TRAINING PROGRAM

## 2024-06-12 PROCEDURE — 80053 COMPREHEN METABOLIC PANEL: CPT | Performed by: STUDENT IN AN ORGANIZED HEALTH CARE EDUCATION/TRAINING PROGRAM

## 2024-06-12 PROCEDURE — 3075F SYST BP GE 130 - 139MM HG: CPT | Mod: CPTII,S$GLB,, | Performed by: STUDENT IN AN ORGANIZED HEALTH CARE EDUCATION/TRAINING PROGRAM

## 2024-06-12 PROCEDURE — 84439 ASSAY OF FREE THYROXINE: CPT | Performed by: STUDENT IN AN ORGANIZED HEALTH CARE EDUCATION/TRAINING PROGRAM

## 2024-06-12 PROCEDURE — 99214 OFFICE O/P EST MOD 30 MIN: CPT | Mod: S$GLB,,, | Performed by: STUDENT IN AN ORGANIZED HEALTH CARE EDUCATION/TRAINING PROGRAM

## 2024-06-12 PROCEDURE — 3079F DIAST BP 80-89 MM HG: CPT | Mod: CPTII,S$GLB,, | Performed by: STUDENT IN AN ORGANIZED HEALTH CARE EDUCATION/TRAINING PROGRAM

## 2024-06-12 RX ORDER — ESCITALOPRAM OXALATE 10 MG/1
10 TABLET ORAL DAILY
Qty: 30 TABLET | Refills: 3 | Status: SHIPPED | OUTPATIENT
Start: 2024-06-12 | End: 2025-06-12

## 2024-06-12 RX ORDER — SUMATRIPTAN 50 MG/1
50 TABLET, FILM COATED ORAL
Qty: 15 TABLET | Refills: 3 | Status: SHIPPED | OUTPATIENT
Start: 2024-06-12 | End: 2024-07-12

## 2024-06-12 RX ORDER — BUPROPION HYDROCHLORIDE 150 MG/1
150 TABLET, EXTENDED RELEASE ORAL 2 TIMES DAILY
COMMUNITY

## 2024-06-12 NOTE — PROGRESS NOTES
"  [facility]    [facility]  [address], [address]  [place] [address]    Subjective     Patient ID: Ariel Hays is a 59 y.o. female who comes to the clinic to establish care.    Chief Complaint: Establish Care (Establish care)    ADHD - takes adderall 15mg daily, written by Fela Gonzalez NP in [place]    Depression/anxiety - takes klonopin 1mg daily and bupropion 300mg daily, written by Charlene Light MD in [place] but she is no longer seeing her    Migraines - with aura, takes excedrin w/ it. Gets one about once a month. Pretty debilitating. Tried imitrex in the past and it worked well    ROS negative unless stated above       Objective     Vitals:    06/12/24 1010   BP: 138/80   BP Location: Left arm   Patient Position: Sitting   BP Method: Medium (Manual)   Pulse: 94   Resp: 18   SpO2: 97%   Weight: 75.8 kg (167 lb)   Height: 5' 3" (1.6 m)        Wt Readings from Last 3 Encounters:   06/12/24 1010 75.8 kg (167 lb)   06/10/24 1108 74.8 kg (165 lb)   05/22/24 1343 76.7 kg (169 lb 1.5 oz)        Physical Exam  Vitals reviewed.   Constitutional:       Appearance: Normal appearance.   HENT:      Head: Normocephalic and atraumatic.   Eyes:      Extraocular Movements: Extraocular movements intact.      Pupils: Pupils are equal, round, and reactive to light.   Cardiovascular:      Rate and Rhythm: Normal rate.      Pulses: Normal pulses.      Heart sounds: Normal heart sounds.   Pulmonary:      Effort: Pulmonary effort is normal. No respiratory distress.      Breath sounds: Normal breath sounds.   Musculoskeletal:         General: Normal range of motion.      Cervical back: Normal range of motion and neck supple.   Skin:     General: Skin is dry.      Capillary Refill: Capillary refill takes less than 2 seconds.   Neurological:      General: No focal deficit present.      Mental Status: She is alert and oriented to person, place, and time. Mental " status is at baseline.   Psychiatric:         Mood and Affect: Mood normal.         Behavior: Behavior normal.         Thought Content: Thought content normal.         Current Outpatient Medications   Medication Instructions    buPROPion (WELLBUTRIN SR) 150 mg, Oral, 2 times daily    clonazePAM (KLONOPIN) 1 mg, Oral, Nightly    doxycycline (VIBRAMYCIN) 100 mg, Oral, Every 12 hours    EScitalopram oxalate (LEXAPRO) 10 mg, Oral, Daily    ondansetron (ZOFRAN) 8 mg, Oral, Every 8 hours PRN    sumatriptan (IMITREX) 50 mg, Oral, Every 2 hours PRN    traMADoL (ULTRAM) 50 mg, Oral, Every 6 hours PRN           Assessment and Plan     1. Encounter to establish care  -     Comprehensive metabolic panel; Future; Expected date: 06/12/2024  -     CBC auto differential; Future; Expected date: 06/12/2024  -     Lipid panel; Future; Expected date: 06/12/2024  -     Hemoglobin A1c; Future; Expected date: 06/12/2024  -     TSH; Future; Expected date: 06/12/2024    2. Moderate episode of recurrent major depressive disorder  -     EScitalopram oxalate (LEXAPRO) 10 MG tablet; Take 1 tablet (10 mg total) by mouth once daily.  Dispense: 30 tablet; Refill: 3    3. Attention deficit hyperactivity disorder (ADHD), predominantly inattentive type    4. Migraine with aura and without status migrainosus, not intractable  -     sumatriptan (IMITREX) 50 MG tablet; Take 1 tablet (50 mg total) by mouth every 2 (two) hours as needed for Migraine.  Dispense: 15 tablet; Refill: 3    5. Colon cancer screening  -     Cologuard Screening (Multitarget Stool DNA); Future; Expected date: 06/12/2024        Here to establish care    For ADHD, will discontinue adderall given SE profile in her age. She is okay w/ this, continue bupropion given some benefit    For Anxiety, weaning off klonopin over the course of 4 weeks. Will start lexapro now, went over SE's. No SI or HI. Continue therapy    For migraines, starting imitrex    Labs as above    Cologuard  ordered    Pap smear next week w/ Dr. Veras    RTC in 6 weeks for depression         I encouraged the patient to take all medications as prescribed and to keep follow up appointments with their providers. Patient stated they had no other concerns. Questions were invited and answered. Follow up sooner if needed.     Abhishek Hudson MD  06/12/2024 9:59 AM

## 2024-06-14 ENCOUNTER — ANESTHESIA (OUTPATIENT)
Dept: SURGERY | Facility: HOSPITAL | Age: 59
End: 2024-06-14
Payer: COMMERCIAL

## 2024-06-14 ENCOUNTER — HOSPITAL ENCOUNTER (OUTPATIENT)
Facility: HOSPITAL | Age: 59
Discharge: HOME | End: 2024-06-14
Attending: PODIATRIST | Admitting: PODIATRIST
Payer: COMMERCIAL

## 2024-06-14 ENCOUNTER — NURSE TRIAGE (OUTPATIENT)
Dept: ADMINISTRATIVE | Facility: CLINIC | Age: 59
End: 2024-06-14
Payer: COMMERCIAL

## 2024-06-14 DIAGNOSIS — M25.879: ICD-10-CM

## 2024-06-14 DIAGNOSIS — M79.2 NEURITIS: ICD-10-CM

## 2024-06-14 DIAGNOSIS — R22.41: ICD-10-CM

## 2024-06-14 DIAGNOSIS — R22.41 MASS OF RIGHT FOOT: Primary | ICD-10-CM

## 2024-06-14 PROCEDURE — 88342 IMHCHEM/IMCYTCHM 1ST ANTB: CPT | Performed by: PATHOLOGY

## 2024-06-14 PROCEDURE — 37000008 HC ANESTHESIA 1ST 15 MINUTES: Performed by: PODIATRIST

## 2024-06-14 PROCEDURE — 88307 TISSUE EXAM BY PATHOLOGIST: CPT | Mod: 26,,, | Performed by: PATHOLOGY

## 2024-06-14 PROCEDURE — 25000003 PHARM REV CODE 250: Performed by: PODIATRIST

## 2024-06-14 PROCEDURE — 28047 RESECT FOOT/TOE TUMOR 3 CM/>: CPT | Mod: RT,,, | Performed by: PODIATRIST

## 2024-06-14 PROCEDURE — 36000706: Performed by: PODIATRIST

## 2024-06-14 PROCEDURE — 25000003 PHARM REV CODE 250: Performed by: NURSE ANESTHETIST, CERTIFIED REGISTERED

## 2024-06-14 PROCEDURE — 63600175 PHARM REV CODE 636 W HCPCS: Performed by: PODIATRIST

## 2024-06-14 PROCEDURE — 88342 IMHCHEM/IMCYTCHM 1ST ANTB: CPT | Mod: 26,,, | Performed by: PATHOLOGY

## 2024-06-14 PROCEDURE — D9220A PRA ANESTHESIA: Mod: CRNA,,, | Performed by: NURSE ANESTHETIST, CERTIFIED REGISTERED

## 2024-06-14 PROCEDURE — 71000039 HC RECOVERY, EACH ADD'L HOUR: Performed by: PODIATRIST

## 2024-06-14 PROCEDURE — 37000009 HC ANESTHESIA EA ADD 15 MINS: Performed by: PODIATRIST

## 2024-06-14 PROCEDURE — D9220A PRA ANESTHESIA: Mod: ANES,,, | Performed by: ANESTHESIOLOGY

## 2024-06-14 PROCEDURE — 71000033 HC RECOVERY, INTIAL HOUR: Performed by: PODIATRIST

## 2024-06-14 PROCEDURE — 88341 IMHCHEM/IMCYTCHM EA ADD ANTB: CPT | Mod: 26,,, | Performed by: PATHOLOGY

## 2024-06-14 PROCEDURE — 36000707: Performed by: PODIATRIST

## 2024-06-14 PROCEDURE — 63600175 PHARM REV CODE 636 W HCPCS: Performed by: NURSE ANESTHETIST, CERTIFIED REGISTERED

## 2024-06-14 PROCEDURE — 71000015 HC POSTOP RECOV 1ST HR: Performed by: PODIATRIST

## 2024-06-14 PROCEDURE — 88341 IMHCHEM/IMCYTCHM EA ADD ANTB: CPT | Performed by: PATHOLOGY

## 2024-06-14 PROCEDURE — 88307 TISSUE EXAM BY PATHOLOGIST: CPT | Performed by: PATHOLOGY

## 2024-06-14 RX ORDER — ONDANSETRON HYDROCHLORIDE 2 MG/ML
INJECTION, SOLUTION INTRAVENOUS
Status: DISCONTINUED | OUTPATIENT
Start: 2024-06-14 | End: 2024-06-14

## 2024-06-14 RX ORDER — DEXAMETHASONE SODIUM PHOSPHATE 4 MG/ML
INJECTION, SOLUTION INTRA-ARTICULAR; INTRALESIONAL; INTRAMUSCULAR; INTRAVENOUS; SOFT TISSUE
Status: DISCONTINUED | OUTPATIENT
Start: 2024-06-14 | End: 2024-06-14

## 2024-06-14 RX ORDER — PROPOFOL 10 MG/ML
VIAL (ML) INTRAVENOUS
Status: DISCONTINUED | OUTPATIENT
Start: 2024-06-14 | End: 2024-06-14

## 2024-06-14 RX ORDER — BUPIVACAINE HYDROCHLORIDE 5 MG/ML
INJECTION, SOLUTION EPIDURAL; INTRACAUDAL
Status: DISCONTINUED | OUTPATIENT
Start: 2024-06-14 | End: 2024-06-14 | Stop reason: HOSPADM

## 2024-06-14 RX ORDER — LIDOCAINE HYDROCHLORIDE 20 MG/ML
INJECTION, SOLUTION EPIDURAL; INFILTRATION; INTRACAUDAL; PERINEURAL
Status: DISCONTINUED | OUTPATIENT
Start: 2024-06-14 | End: 2024-06-14

## 2024-06-14 RX ORDER — MIDAZOLAM HYDROCHLORIDE 1 MG/ML
INJECTION INTRAMUSCULAR; INTRAVENOUS
Status: DISCONTINUED | OUTPATIENT
Start: 2024-06-14 | End: 2024-06-14

## 2024-06-14 RX ORDER — KETOROLAC TROMETHAMINE 30 MG/ML
INJECTION, SOLUTION INTRAMUSCULAR; INTRAVENOUS
Status: DISCONTINUED | OUTPATIENT
Start: 2024-06-14 | End: 2024-06-14

## 2024-06-14 RX ORDER — LIDOCAINE HYDROCHLORIDE 10 MG/ML
INJECTION INFILTRATION; PERINEURAL
Status: DISCONTINUED | OUTPATIENT
Start: 2024-06-14 | End: 2024-06-14 | Stop reason: HOSPADM

## 2024-06-14 RX ORDER — MULTIVITAMIN
1 TABLET ORAL DAILY
COMMUNITY

## 2024-06-14 RX ORDER — OXYCODONE HYDROCHLORIDE 5 MG/1
5 TABLET ORAL
Status: DISCONTINUED | OUTPATIENT
Start: 2024-06-14 | End: 2024-06-14 | Stop reason: HOSPADM

## 2024-06-14 RX ORDER — EPHEDRINE SULFATE 50 MG/ML
INJECTION, SOLUTION INTRAVENOUS
Status: DISCONTINUED | OUTPATIENT
Start: 2024-06-14 | End: 2024-06-14

## 2024-06-14 RX ORDER — HYDROMORPHONE HYDROCHLORIDE 1 MG/ML
0.2 INJECTION, SOLUTION INTRAMUSCULAR; INTRAVENOUS; SUBCUTANEOUS EVERY 5 MIN PRN
Status: DISCONTINUED | OUTPATIENT
Start: 2024-06-14 | End: 2024-06-14 | Stop reason: HOSPADM

## 2024-06-14 RX ORDER — SODIUM CHLORIDE, SODIUM LACTATE, POTASSIUM CHLORIDE, CALCIUM CHLORIDE 600; 310; 30; 20 MG/100ML; MG/100ML; MG/100ML; MG/100ML
INJECTION, SOLUTION INTRAVENOUS CONTINUOUS
Status: DISCONTINUED | OUTPATIENT
Start: 2024-06-14 | End: 2024-06-14 | Stop reason: HOSPADM

## 2024-06-14 RX ADMIN — EPHEDRINE SULFATE 10 MG: 50 INJECTION INTRAVENOUS at 08:06

## 2024-06-14 RX ADMIN — EPHEDRINE SULFATE 10 MG: 50 INJECTION INTRAVENOUS at 07:06

## 2024-06-14 RX ADMIN — KETOROLAC TROMETHAMINE 30 MG: 30 INJECTION, SOLUTION INTRAMUSCULAR; INTRAVENOUS at 07:06

## 2024-06-14 RX ADMIN — SODIUM CHLORIDE, POTASSIUM CHLORIDE, SODIUM LACTATE AND CALCIUM CHLORIDE: 600; 310; 30; 20 INJECTION, SOLUTION INTRAVENOUS at 07:06

## 2024-06-14 RX ADMIN — PROPOFOL 150 MG: 10 INJECTION, EMULSION INTRAVENOUS at 07:06

## 2024-06-14 RX ADMIN — SODIUM CHLORIDE, POTASSIUM CHLORIDE, SODIUM LACTATE AND CALCIUM CHLORIDE: 600; 310; 30; 20 INJECTION, SOLUTION INTRAVENOUS at 09:06

## 2024-06-14 RX ADMIN — MIDAZOLAM HYDROCHLORIDE 2 MG: 1 INJECTION, SOLUTION INTRAMUSCULAR; INTRAVENOUS at 07:06

## 2024-06-14 RX ADMIN — CEFAZOLIN 2 G: 2 INJECTION, POWDER, FOR SOLUTION INTRAMUSCULAR; INTRAVENOUS at 07:06

## 2024-06-14 RX ADMIN — ONDANSETRON 4 MG: 2 INJECTION INTRAMUSCULAR; INTRAVENOUS at 07:06

## 2024-06-14 RX ADMIN — DEXAMETHASONE SODIUM PHOSPHATE 4 MG: 4 INJECTION, SOLUTION INTRA-ARTICULAR; INTRALESIONAL; INTRAMUSCULAR; INTRAVENOUS; SOFT TISSUE at 07:06

## 2024-06-14 RX ADMIN — LIDOCAINE HYDROCHLORIDE 50 MG: 20 INJECTION, SOLUTION EPIDURAL; INFILTRATION; INTRACAUDAL; PERINEURAL at 07:06

## 2024-06-14 NOTE — TELEPHONE ENCOUNTER
[place]    PCP:  Dr. Abhishek Hudson    S/P Rt Foot Mass Excision today Dr. Wilber Nicole.  C/O Rt foot pain (back of the heel that radiates to the ankle area) rated 7/10 and numbness/tingling in Rt foot top of the toes.  She took Tramadol ~ 2.5 hrs ago.  Denies fever, SOB, CP, drainage, foul odor, uncontrolled nausea with nausea med, vomiting, diarrhea, and constipation.  Per protocol, care advised is Surgeon now.  NT contacted [facility].  There is no OCP for Dr. Nicole therefore advised pt that she will need to go to the ED to be evaluated.  Pt VU.  Advised to call for worsening/questions/concerns.  VU.    Reason for Disposition   [1] SEVERE post-op pain (e.g., excruciating, pain scale 8-10) AND [2] not controlled with pain medications    Additional Information   Negative: Sounds like a life-threatening emergency to the triager   Negative: [1] Widespread rash AND [2] bright red, sunburn-like   Negative: [1] SEVERE headache AND [2] after spinal (epidural) anesthesia   Negative: [1] Vomiting AND [2] persists > 4 hours   Negative: [1] Vomiting AND [2] abdomen looks much more swollen than usual   Negative: [1] Drinking very little AND [2] dehydration suspected (e.g., no urine > 12 hours, very dry mouth, very lightheaded)   Negative: Patient sounds very sick or weak to the triager   Negative: Sounds like a serious complication to the triager   Negative: Fever > 100.4 F (38.0 C)    Protocols used: Post-Op Symptoms and [id number]

## 2024-06-14 NOTE — ANESTHESIA POSTPROCEDURE EVALUATION
Anesthesia Post Evaluation    Patient: Ariel Hays    Procedure(s) Performed: Procedure(s) (LRB):  EXCISION, MASS, FOOT (Right)    Final Anesthesia Type: general      Patient location during evaluation: PACU  Patient participation: Yes- Able to Participate  Level of consciousness: awake and alert, oriented and awake  Post-procedure vital signs: reviewed and stable  Pain management: adequate  Airway patency: patent    PONV status at discharge: No PONV  Anesthetic complications: no      Cardiovascular status: blood pressure returned to baseline, hemodynamically stable and stable  Respiratory status: unassisted, spontaneous ventilation and room air  Hydration status: euvolemic  Follow-up not needed.              Vitals Value Taken Time   /63 06/14/24 0950   Temp 36.6 °C (97.8 °F) 06/14/24 0908   Pulse 84 06/14/24 0955   Resp 19 06/14/24 0955   SpO2 97 % 06/14/24 0955   Vitals shown include unfiled device data.      No case tracking events are documented in the log.      Pain/Galina Score: Galina Score: 10 (6/14/2024  9:50 AM)  Modified Galina Score: 19 (6/14/2024  9:50 AM)

## 2024-06-14 NOTE — DISCHARGE SUMMARY
[facility]  Discharge Note  Short Stay    Procedure(s) (LRB):  EXCISION, MASS, FOOT (Right)      OUTCOME: Patient tolerated treatment/procedure well without complication and is now ready for discharge.    DISPOSITION: Home or Self Care    FINAL DIAGNOSIS:  Mass of skin of foot, right    FOLLOWUP: In clinic    DISCHARGE INSTRUCTIONS:    Discharge Procedure Orders   Ice to affected area     Keep surgical extremity elevated     Lifting restrictions     Weight bearing restrictions (specify):        TIME SPENT ON DISCHARGE: 8 minutes

## 2024-06-14 NOTE — TRANSFER OF CARE
"Anesthesia Transfer of Care Note    Patient: Ariel Hays    Procedure(s) Performed: Procedure(s) (LRB):  EXCISION, MASS, FOOT (Right)    Patient location: PACU    Anesthesia Type: general    Transport from OR: Transported from OR on room air with adequate spontaneous ventilation    Post pain: adequate analgesia    Post assessment: no apparent anesthetic complications and tolerated procedure well    Post vital signs: stable    Level of consciousness: awake, alert and oriented    Nausea/Vomiting: no nausea/vomiting    Complications: none    Transfer of care protocol was followed      Last vitals: Visit Vitals  /60 (BP Location: Left arm, Patient Position: Lying)   Pulse 72   Temp 36.9 °C (98.4 °F) (Temporal)   Resp 16   Ht 5' 3" (1.6 m)   Wt 75.8 kg (167 lb)   SpO2 98%   Breastfeeding No   BMI 29.58 kg/m²     "

## 2024-06-14 NOTE — PLAN OF CARE
Pt AAAx4, son at bedside, right leg elevated on pillow with ice pack under knee and foot. Pt able to tolerate water. 0/10 pain at this time.

## 2024-06-14 NOTE — ANESTHESIA PROCEDURE NOTES
Intubation    Date/Time: 6/14/2024 7:30 AM    Performed by: Kindra May CRNA  Authorized by: Yifan Hawkins MD    Intubation:     Induction:  Intravenous    Intubated:  Postinduction    Mask Ventilation:  Easy mask    Attempts:  1    Attempted By:  CRNA    Difficult Airway Encountered?: No      Complications:  None    Airway Device:  Supraglottic airway/LMA    Airway Device Size:  4.0    Secured at:  The lips    Placement Verified By:  Capnometry    Complicating Factors:  None    Findings Post-Intubation:  BS equal bilateral

## 2024-06-14 NOTE — OP NOTE
[facility]  Operative Note     SUMMARY     Surgery Date: 6/14/2024       Pre-op Diagnosis:  Mass of joint of foot [M25.879]    Post-op Diagnosis:  Post-Op Diagnosis Codes:     * Mass of joint of foot [M25.879]    Procedure(s) (LRB):  EXCISION, MASS, FOOT (Right)  Radical complex excision soft tissue mass x3 procedure code 28047 x3 right foot  Surgeons and Role:     * Wilber Nicole DPM - Primary      Estimated Blood Loss:  Less than 5 cc  Hemostasis:  Ankle tourniquet placed at 250 mmHg for a period of 49 minutes    Anesthesia:  LMA in conjunction with local infiltrate equaling 30 cc of 1% lidocaine plain  Injectables 30 cc of 0.5% Marcaine plain  Pathology 3 large soft tissue specimens from the plantar surface of the patient's right foot the 1st specimen from the plantar midfoot was 4.6 cm long by 2.5 cm wide by 2 cm thick  Second specimen sub 1st MPJ right 3.2 cm long by 2 point 0 cm wide by 1.2 cm thick  3rd specimen from the plantar right arch 4.5 cm long by 3.6 cm wide by 1.8 cm thick  Materials sterile irrigant 4.0 Monocryl  Condition stable  Complications none    Description of the findings of the procedure:  Mass of joint of foot [M25.879]         Specimens (From admission, onward)       Start     Ordered    06/14/24 0817  Specimen to Pathology, Surgery Soft tissue, tumors, and Sarcoma  Once        Comments: Pre-op Diagnosis: Mass of joint of foot [M25.879]Procedure(s):EXCISION, MASS, FOOT Number of specimens: 3Name of specimens: 1. Right midforefoot mass 2. Right subfirst mpj mass  3. Right foot arch mass     References:    Click here for ordering Quick Tip   Question Answer Comment   Procedure Type: Soft tissue, tumors, and Sarcoma    Release to patient Immediate        06/14/24 0834                     Procedure:  Patient was taken the operating room placed in supine position on the OR table attention was then directed towards the patient's right foot where Webril cast padding was placed  around the patient's right ankle as was an ankle tourniquet.  Following this patient was locally anesthetized utilizing a total of 30 cc of 1% lidocaine plain in each of the areas where the patient had a soft tissue mass on the plantar surface of the right foot.  Patient's foot was then prepped and draped in the usual sterile manner patient's foot was exsanguinated utilizing an Esmarch bandage the ankle tourniquet was raised to 250 mmHg.  Following this a lazy-S incision was created following relaxed skin tension lines over the soft tissue mass on the plantar forefoot right this was centrally located following the initial incision a large firm soft tissue mass herniated out of the incision site this required both blunt and sharp dissection that was complex and radical in nature down to the level of bone the mass was removed in toto and was a proximally 4.6 cm long by 2.5 cm wide by 2 cm thick it was sent to pathology for evaluation no portion of the mass was noted be remaining within the surgical field.  The surgical site was then flushed irrigated with copious amounts of sterile saline closure was achieved with 4.0 Monocryl in a simple interrupted fashion realigning the edges of the lazy-S incision.  An additional lazy-S incision was created overlying the plantar aspect of the 1st MPJ right following relaxed skin tension lines.  Immediately upon making the incision soft tissue mass herniated from the surgical field this required complex radical excision as this was very deep down to the level underlying the 1st MPJ right the sesamoid region this was butting up against the flexor tendons of the patient's right hallux careful dissection to remove the soft tissue mass in toto was performed the mass itself was a proximally 3.2 cm long by 2 cm wide by 1.2 cm thick the area was flushed irrigated with copious amounts of sterile saline the mass was sent to pathology for further evaluation.  Primary closure was achieved  with 4.0 Monocryl in a simple interrupted fashion reapproximating and realigning the incision of the lazy-S incision sub 1st MPJ right.  A 3rd lazy-S incision was created overlying the plantar and plantar medial aspect of the patient's right arch where the patient had the largest of the soft tissue mass this lazy-S incision was created following relaxed skin tension lines immediately upon making the incision a large amount of soft tissue mass herniated from the incision site this required both blunt and sharp dissection to radically excise the soft tissue mass required complex dissection the soft tissue mass itself extended through the plantar fascial ligament and was butting up against the plantar musculature of the plantar right foot in the area of the arch.  Upon removing the mass the mass was 4.5 cm long by 3.6 cm wide by 1.8 cm thick it was removed in toto evaluation of the surgical site indicated no remaining mass was still present the area was flushed irrigated with copious amounts of sterile saline the incision was reapproximated and carefully repaired with 4.0 Monocryl in a simple interrupted fashion.  Patient was then injected with additional 30 cc of 0.5% Marcaine plain to create a long-term postoperative anesthetic ankle tourniquet was lowered no significant bleeding noted Adaptic nonadhesive dressing was applied to each of the incisions and fluffed gauze were used to apply mild compression overlying the incision sites to prevent hematoma sterile 4x4s multiple ABDs multiple rolls of Kerlix were used to dress the area and the patient was then placed in a lower leg posterior splint to protect the surgical site on the right lower extremity.  Patient left the operating room with vital signs stable and vascular status having return to the patient's preoperative levels.  Radical complex dissection and excision was performed on all 3 separate soft tissue masses requiring 3 separate incisions extensive  debridement dissection due to the nature of the soft tissue mass depth of the soft tissue mass and surrounding soft tissue structures.  Patient will maintain complete nonweightbearing status for the next 4-6 weeks.This note was created using [employer] voice recognition software that occasionally misinterpreted phrases or words.

## 2024-06-17 ENCOUNTER — TELEPHONE (OUTPATIENT)
Dept: PODIATRY | Facility: CLINIC | Age: 59
End: 2024-06-17
Payer: COMMERCIAL

## 2024-06-17 ENCOUNTER — OFFICE VISIT (OUTPATIENT)
Dept: PODIATRY | Facility: CLINIC | Age: 59
End: 2024-06-17
Payer: COMMERCIAL

## 2024-06-17 VITALS
WEIGHT: 167.13 LBS | DIASTOLIC BLOOD PRESSURE: 71 MMHG | HEART RATE: 82 BPM | BODY MASS INDEX: 29.61 KG/M2 | HEIGHT: 63 IN | RESPIRATION RATE: 16 BRPM | SYSTOLIC BLOOD PRESSURE: 112 MMHG

## 2024-06-17 VITALS
HEIGHT: 63 IN | BODY MASS INDEX: 29.59 KG/M2 | WEIGHT: 167 LBS | TEMPERATURE: 98 F | OXYGEN SATURATION: 99 % | RESPIRATION RATE: 15 BRPM | SYSTOLIC BLOOD PRESSURE: 133 MMHG | HEART RATE: 87 BPM | DIASTOLIC BLOOD PRESSURE: 63 MMHG

## 2024-06-17 DIAGNOSIS — R22.41: Primary | ICD-10-CM

## 2024-06-17 PROCEDURE — 99999 PR PBB SHADOW E&M-EST. PATIENT-LVL IV: CPT | Mod: PBBFAC,,, | Performed by: PODIATRIST

## 2024-06-17 NOTE — TELEPHONE ENCOUNTER
[facility] for patient to return call. Patient called nurse triage over the weekend.     S/P Rt Foot Mass Excision today Dr. Wilber Nicole.  C/O Rt foot pain (back of the heel that radiates to the ankle area) rated 7/10 and numbness/tingling in Rt foot top of the toes.  She took Tramadol ~ 2.5 hrs ago.  Denies fever, SOB, CP, drainage, foul odor, uncontrolled nausea with nausea med, vomiting, diarrhea, and constipation.  Per protocol, care advised is Surgeon now.  NT contacted [facility].  There is no OCP for Dr. Nicole therefore advised pt that she will need to go to the ED to be evaluated.  Pt VU.  Please contact pt directly for any further advisement.
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
## 2024-06-19 ENCOUNTER — PATIENT MESSAGE (OUTPATIENT)
Dept: PODIATRY | Facility: CLINIC | Age: 59
End: 2024-06-19
Payer: COMMERCIAL

## 2024-06-19 LAB
FINAL PATHOLOGIC DIAGNOSIS: NORMAL
GROSS: NORMAL
Lab: NORMAL

## 2024-06-19 RX ORDER — TRAMADOL HYDROCHLORIDE 50 MG/1
50 TABLET ORAL 4 TIMES DAILY PRN
Qty: 28 TABLET | Refills: 0 | Status: SHIPPED | OUTPATIENT
Start: 2024-06-19 | End: 2024-06-26

## 2024-06-19 NOTE — TELEPHONE ENCOUNTER
Patient is requesting a refill prescription for Tramadol. Her last office visit was 06/17/2024. SARAH Fontanez 06/19/2024

## 2024-06-24 ENCOUNTER — OFFICE VISIT (OUTPATIENT)
Dept: PODIATRY | Facility: CLINIC | Age: 59
End: 2024-06-24
Payer: COMMERCIAL

## 2024-06-24 VITALS
WEIGHT: 165.38 LBS | HEIGHT: 63 IN | SYSTOLIC BLOOD PRESSURE: 114 MMHG | RESPIRATION RATE: 16 BRPM | HEART RATE: 70 BPM | DIASTOLIC BLOOD PRESSURE: 64 MMHG | BODY MASS INDEX: 29.3 KG/M2

## 2024-06-24 DIAGNOSIS — R22.41: Primary | ICD-10-CM

## 2024-06-24 PROCEDURE — 3078F DIAST BP <80 MM HG: CPT | Mod: CPTII,S$GLB,, | Performed by: PODIATRIST

## 2024-06-24 PROCEDURE — 1159F MED LIST DOCD IN RCRD: CPT | Mod: CPTII,S$GLB,, | Performed by: PODIATRIST

## 2024-06-24 PROCEDURE — 3074F SYST BP LT 130 MM HG: CPT | Mod: CPTII,S$GLB,, | Performed by: PODIATRIST

## 2024-06-24 PROCEDURE — 1160F RVW MEDS BY RX/DR IN RCRD: CPT | Mod: CPTII,S$GLB,, | Performed by: PODIATRIST

## 2024-06-24 PROCEDURE — 99999 PR PBB SHADOW E&M-EST. PATIENT-LVL IV: CPT | Mod: PBBFAC,,, | Performed by: PODIATRIST

## 2024-06-24 PROCEDURE — 99024 POSTOP FOLLOW-UP VISIT: CPT | Mod: S$GLB,,, | Performed by: PODIATRIST

## 2024-06-24 PROCEDURE — 3044F HG A1C LEVEL LT 7.0%: CPT | Mod: CPTII,S$GLB,, | Performed by: PODIATRIST

## 2024-06-24 PROCEDURE — 29515 APPLICATION SHORT LEG SPLINT: CPT | Mod: 58,RT,S$GLB, | Performed by: PODIATRIST

## 2024-06-24 RX ORDER — MIRTAZAPINE 15 MG/1
15 TABLET, FILM COATED ORAL NIGHTLY
COMMUNITY
Start: 2024-06-19

## 2024-06-24 RX ORDER — BUPROPION HYDROCHLORIDE 150 MG/1
300 TABLET ORAL
COMMUNITY
Start: 2024-06-19 | End: 2024-06-25 | Stop reason: SDUPTHER
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
## 2024-06-26 ENCOUNTER — PATIENT MESSAGE (OUTPATIENT)
Dept: PODIATRY | Facility: CLINIC | Age: 59
End: 2024-06-26
Payer: COMMERCIAL

## 2024-07-08 ENCOUNTER — PATIENT MESSAGE (OUTPATIENT)
Dept: PODIATRY | Facility: CLINIC | Age: 59
End: 2024-07-08

## 2024-07-08 ENCOUNTER — OFFICE VISIT (OUTPATIENT)
Dept: PODIATRY | Facility: CLINIC | Age: 59
End: 2024-07-08
Payer: COMMERCIAL

## 2024-07-08 VITALS
SYSTOLIC BLOOD PRESSURE: 150 MMHG | WEIGHT: 165.38 LBS | DIASTOLIC BLOOD PRESSURE: 68 MMHG | HEIGHT: 63 IN | BODY MASS INDEX: 29.3 KG/M2 | HEART RATE: 74 BPM

## 2024-07-08 DIAGNOSIS — R22.41 MASS OF RIGHT FOOT: Primary | ICD-10-CM

## 2024-07-08 PROCEDURE — 99999 PR PBB SHADOW E&M-EST. PATIENT-LVL IV: CPT | Mod: PBBFAC,,, | Performed by: PODIATRIST

## 2024-07-08 PROCEDURE — 3077F SYST BP >= 140 MM HG: CPT | Mod: CPTII,S$GLB,, | Performed by: PODIATRIST

## 2024-07-08 PROCEDURE — 3044F HG A1C LEVEL LT 7.0%: CPT | Mod: CPTII,S$GLB,, | Performed by: PODIATRIST

## 2024-07-08 PROCEDURE — 99024 POSTOP FOLLOW-UP VISIT: CPT | Mod: S$GLB,,, | Performed by: PODIATRIST

## 2024-07-08 PROCEDURE — 1160F RVW MEDS BY RX/DR IN RCRD: CPT | Mod: CPTII,S$GLB,, | Performed by: PODIATRIST

## 2024-07-08 PROCEDURE — 1159F MED LIST DOCD IN RCRD: CPT | Mod: CPTII,S$GLB,, | Performed by: PODIATRIST

## 2024-07-08 PROCEDURE — 3078F DIAST BP <80 MM HG: CPT | Mod: CPTII,S$GLB,, | Performed by: PODIATRIST

## 2024-07-08 RX ORDER — IBUPROFEN 800 MG/1
800 TABLET ORAL NIGHTLY
COMMUNITY

## 2024-07-08 RX ORDER — GABAPENTIN 300 MG/1
600 CAPSULE ORAL NIGHTLY
Qty: 60 CAPSULE | Refills: 1 | Status: SHIPPED | OUTPATIENT
Start: 2024-07-08 | End: 2024-09-06
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
## 2024-07-10 ENCOUNTER — PATIENT MESSAGE (OUTPATIENT)
Dept: PODIATRY | Facility: CLINIC | Age: 59
End: 2024-07-10
Payer: COMMERCIAL

## 2024-07-17 ENCOUNTER — PATIENT MESSAGE (OUTPATIENT)
Dept: FAMILY MEDICINE | Facility: CLINIC | Age: 59
End: 2024-07-17
Payer: COMMERCIAL

## 2024-07-18 ENCOUNTER — TELEPHONE (OUTPATIENT)
Dept: PODIATRY | Facility: CLINIC | Age: 59
End: 2024-07-18
Payer: COMMERCIAL

## 2024-07-18 NOTE — TELEPHONE ENCOUNTER
Patient says that when she changed bandage this morning she noticed about 3 inch diameter of bright red blood on dressing. This was overnight while not bearing weight. Patient doesn't think incision has opened. Patient has been walking with her boot but not an extreme amount. Patient says the foot hurts more than she thought it would at this point after surgery. Patient wants to know if she should be concerned or if this is normal.

## 2024-07-18 NOTE — TELEPHONE ENCOUNTER
----- Message from Shanthi Allen sent at 7/18/2024  7:43 AM CDT -----  Contact: pt  Type: Needs Medical Advice         Who Called: pt    Best Call Back Number:[phone]    Additional Information: Requesting a call back regarding  pt is stating her foot is bleeding more the it should be and asking for the office to call  her please.     Please Advise- Thank you

## 2024-07-18 NOTE — TELEPHONE ENCOUNTER
Advised patient blood is to be expected with weight bearing on hematoma and also discomfort from scar tissue. Scar tissue and hematoma breaking down from weight bearing. Patient verbalized understanding.

## 2024-07-24 ENCOUNTER — OFFICE VISIT (OUTPATIENT)
Dept: PODIATRY | Facility: CLINIC | Age: 59
End: 2024-07-24
Payer: COMMERCIAL

## 2024-07-24 VITALS
HEIGHT: 63 IN | DIASTOLIC BLOOD PRESSURE: 70 MMHG | HEART RATE: 84 BPM | WEIGHT: 167 LBS | SYSTOLIC BLOOD PRESSURE: 114 MMHG | BODY MASS INDEX: 29.59 KG/M2

## 2024-07-24 DIAGNOSIS — R22.41 MASS OF RIGHT FOOT: Primary | ICD-10-CM

## 2024-07-24 PROCEDURE — 1160F RVW MEDS BY RX/DR IN RCRD: CPT | Mod: CPTII,S$GLB,, | Performed by: PODIATRIST

## 2024-07-24 PROCEDURE — 1159F MED LIST DOCD IN RCRD: CPT | Mod: CPTII,S$GLB,, | Performed by: PODIATRIST

## 2024-07-24 PROCEDURE — 3044F HG A1C LEVEL LT 7.0%: CPT | Mod: CPTII,S$GLB,, | Performed by: PODIATRIST

## 2024-07-24 PROCEDURE — 3074F SYST BP LT 130 MM HG: CPT | Mod: CPTII,S$GLB,, | Performed by: PODIATRIST

## 2024-07-24 PROCEDURE — 99999 PR PBB SHADOW E&M-EST. PATIENT-LVL III: CPT | Mod: PBBFAC,,, | Performed by: PODIATRIST

## 2024-07-24 PROCEDURE — 99024 POSTOP FOLLOW-UP VISIT: CPT | Mod: S$GLB,,, | Performed by: PODIATRIST

## 2024-07-24 PROCEDURE — 3078F DIAST BP <80 MM HG: CPT | Mod: CPTII,S$GLB,, | Performed by: PODIATRIST

## 2024-07-24 RX ORDER — MIRTAZAPINE 15 MG/1
TABLET, FILM COATED ORAL
COMMUNITY
Start: 2024-07-15 | End: 2024-07-27

## 2024-07-25 ENCOUNTER — PATIENT MESSAGE (OUTPATIENT)
Dept: PODIATRY | Facility: CLINIC | Age: 59
End: 2024-07-25
Payer: COMMERCIAL
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
## 2024-07-29 ENCOUNTER — PATIENT MESSAGE (OUTPATIENT)
Dept: PODIATRY | Facility: CLINIC | Age: 59
End: 2024-07-29
Payer: COMMERCIAL

## 2024-08-01 ENCOUNTER — PATIENT MESSAGE (OUTPATIENT)
Dept: PODIATRY | Facility: CLINIC | Age: 59
End: 2024-08-01
Payer: COMMERCIAL

## 2024-08-07 ENCOUNTER — OFFICE VISIT (OUTPATIENT)
Dept: PODIATRY | Facility: CLINIC | Age: 59
End: 2024-08-07
Payer: COMMERCIAL

## 2024-08-07 VITALS
HEART RATE: 78 BPM | WEIGHT: 167.13 LBS | SYSTOLIC BLOOD PRESSURE: 124 MMHG | RESPIRATION RATE: 16 BRPM | BODY MASS INDEX: 29.61 KG/M2 | DIASTOLIC BLOOD PRESSURE: 58 MMHG | HEIGHT: 63 IN

## 2024-08-07 DIAGNOSIS — B35.3 TINEA PEDIS OF RIGHT FOOT: ICD-10-CM

## 2024-08-07 DIAGNOSIS — R22.41 MASS OF RIGHT FOOT: Primary | ICD-10-CM

## 2024-08-07 PROCEDURE — 99999 PR PBB SHADOW E&M-EST. PATIENT-LVL IV: CPT | Mod: PBBFAC,,, | Performed by: PODIATRIST

## 2024-08-07 PROCEDURE — 99213 OFFICE O/P EST LOW 20 MIN: CPT | Mod: 24,S$GLB,, | Performed by: PODIATRIST

## 2024-08-07 RX ORDER — BUPROPION HYDROCHLORIDE 300 MG/1
300 TABLET ORAL
COMMUNITY
Start: 2024-07-25

## 2024-08-07 RX ORDER — BUSPIRONE HYDROCHLORIDE 10 MG/1
10 TABLET ORAL 2 TIMES DAILY
COMMUNITY
Start: 2024-07-25

## 2024-08-28 ENCOUNTER — PATIENT MESSAGE (OUTPATIENT)
Dept: PODIATRY | Facility: CLINIC | Age: 59
End: 2024-08-28
Payer: COMMERCIAL

## 2024-09-09 ENCOUNTER — PATIENT MESSAGE (OUTPATIENT)
Dept: PODIATRY | Facility: CLINIC | Age: 59
End: 2024-09-09
Payer: COMMERCIAL

## 2024-09-10 ENCOUNTER — TELEPHONE (OUTPATIENT)
Dept: PODIATRY | Facility: CLINIC | Age: 59
End: 2024-09-10
Payer: COMMERCIAL

## 2024-09-13 ENCOUNTER — TELEPHONE (OUTPATIENT)
Dept: PODIATRY | Facility: CLINIC | Age: 59
End: 2024-09-13
Payer: COMMERCIAL

## 2024-09-13 NOTE — TELEPHONE ENCOUNTER
LVM for patient and call back number to reschedule appointment that was cancelled due to inclement weather. Will leave message in portal as well.

## 2024-09-18 ENCOUNTER — OFFICE VISIT (OUTPATIENT)
Dept: PODIATRY | Facility: CLINIC | Age: 59
End: 2024-09-18
Payer: COMMERCIAL

## 2024-09-18 VITALS
RESPIRATION RATE: 18 BRPM | DIASTOLIC BLOOD PRESSURE: 82 MMHG | BODY MASS INDEX: 29.61 KG/M2 | HEART RATE: 75 BPM | HEIGHT: 63 IN | WEIGHT: 167.13 LBS | SYSTOLIC BLOOD PRESSURE: 149 MMHG

## 2024-09-18 DIAGNOSIS — R22.41 MASS OF RIGHT FOOT: Primary | ICD-10-CM

## 2024-09-18 PROCEDURE — 3079F DIAST BP 80-89 MM HG: CPT | Mod: CPTII,S$GLB,, | Performed by: PODIATRIST

## 2024-09-18 PROCEDURE — 1159F MED LIST DOCD IN RCRD: CPT | Mod: CPTII,S$GLB,, | Performed by: PODIATRIST

## 2024-09-18 PROCEDURE — 3077F SYST BP >= 140 MM HG: CPT | Mod: CPTII,S$GLB,, | Performed by: PODIATRIST

## 2024-09-18 PROCEDURE — 99213 OFFICE O/P EST LOW 20 MIN: CPT | Mod: S$GLB,,, | Performed by: PODIATRIST

## 2024-09-18 PROCEDURE — 99999 PR PBB SHADOW E&M-EST. PATIENT-LVL IV: CPT | Mod: PBBFAC,,, | Performed by: PODIATRIST

## 2024-09-18 PROCEDURE — 3008F BODY MASS INDEX DOCD: CPT | Mod: CPTII,S$GLB,, | Performed by: PODIATRIST

## 2024-09-18 PROCEDURE — 3044F HG A1C LEVEL LT 7.0%: CPT | Mod: CPTII,S$GLB,, | Performed by: PODIATRIST

## 2024-09-18 PROCEDURE — 1160F RVW MEDS BY RX/DR IN RCRD: CPT | Mod: CPTII,S$GLB,, | Performed by: PODIATRIST

## 2024-09-18 RX ORDER — DOCUSATE SODIUM 100 MG/1
100 CAPSULE, LIQUID FILLED ORAL
COMMUNITY
Start: 2024-08-12 | End: 2024-09-21

## 2024-09-18 RX ORDER — BUPROPION HYDROCHLORIDE 150 MG/1
150 TABLET ORAL
COMMUNITY
Start: 2024-08-22 | End: 2024-09-21

## 2024-09-18 RX ORDER — DOXEPIN 3 MG/1
1 TABLET, FILM COATED ORAL NIGHTLY
COMMUNITY
Start: 2024-08-22

## 2024-09-18 RX ORDER — MIRTAZAPINE 15 MG/1
7.5-15 TABLET, FILM COATED ORAL NIGHTLY
COMMUNITY
Start: 2024-08-22 | End: 2024-09-21

## 2024-09-18 RX ORDER — BUSPIRONE HYDROCHLORIDE 15 MG/1
15 TABLET ORAL 2 TIMES DAILY
COMMUNITY
Start: 2024-08-22 | End: 2024-09-21

## 2024-09-18 RX ORDER — ATOMOXETINE 40 MG/1
40 CAPSULE ORAL EVERY MORNING
COMMUNITY
Start: 2024-09-17

## 2024-09-22 NOTE — PROGRESS NOTES
"Ariel Hays is a 59 y.o. female patient.   1. Mass of right foot      Past Medical History:   Diagnosis Date    ADHD     Depression     Migraines      No past surgical history pertinent negatives on file.  Scheduled Meds:  Continuous Infusions:  PRN Meds:    Review of patient's allergies indicates:   Allergen Reactions    Codeine Anaphylaxis       Anaphylaxis unlikely. Pt was in her 20s, received po codeine for sinusitis, she thinks.  Felt like her throat was closing.  Not consistent with true anaphylaxis    Sulfa (sulfonamide antibiotics) Anaphylaxis       Anaphylaxis unlikely.  Pt received sulfa about 8 yrs ago for sinus infection.  States she felt similar to prior episode, [facility], went home.  Not consistent with true anaphylaxis reaction.  Discussed w pt and her family member    Sulfamethoxazole-trimethoprim Shortness Of Breath     shortness of breath    Hydrocodone-acetaminophen      Other reaction(s): Nausea    Milk containing products (dairy)      There are no hospital problems to display for this patient.                                                                                                                      Blood pressure (!) 149/82, pulse 75, resp. rate 18, height 5' 3" (1.6 m), weight 75.8 kg (167 lb 1.7 oz).    Subjective  Objective:  Vital signs (most recent): Blood pressure (!) 149/82, pulse 75, resp. rate 18, height 5' 3" (1.6 m), weight 75.8 kg (167 lb 1.7 oz).    Assessment & Plan  Patient presents for follow-up after having 3 large benign soft tissue masses removed from the plantar aspect of her right foot.  Patient is currently beyond her global period following this surgery and has required additional treatment and wound care.  On evaluation patient is doing very well she is not having significant pain or discomfort just a little bit of tenderness on the plantar forefoot right she still has a very small scab in this area the patient states " that 3 days after I saw her last most of the larger scabs had fallen off she is going to have some residual scar tissue in the bottom of the right foot that will break down over time especially as she starts walking more doing more I have recommended the application of cocoa butter with vitamin-E to the incisions to help with the remodeling of the scar tissue and scarring at the surface.  I am encouraging the patient to gradually increase activity as tolerated she can certainly wear regular shoe does not need to keep a dressing on any of these areas and I am releasing the patient following previous surgery if she has any problems questions or concerns certainly she is to contact us.  This note was created using [employer] voice recognition software that occasionally misinterpreted phrases or words.   Wilber Nicole DPM  9/21/2024

## 2024-11-04 ENCOUNTER — OFFICE VISIT (OUTPATIENT)
Dept: FAMILY MEDICINE | Facility: CLINIC | Age: 59
End: 2024-11-04
Payer: COMMERCIAL

## 2024-11-04 DIAGNOSIS — G93.31 POST VIRAL SYNDROME: ICD-10-CM

## 2024-11-04 DIAGNOSIS — J01.90 ACUTE BACTERIAL SINUSITIS: Primary | ICD-10-CM

## 2024-11-04 DIAGNOSIS — J30.89 NON-SEASONAL ALLERGIC RHINITIS DUE TO OTHER ALLERGIC TRIGGER: ICD-10-CM

## 2024-11-04 DIAGNOSIS — B96.89 ACUTE BACTERIAL SINUSITIS: Primary | ICD-10-CM

## 2024-11-04 PROCEDURE — 99212 OFFICE O/P EST SF 10 MIN: CPT | Mod: 95,,,

## 2024-11-04 PROCEDURE — 3044F HG A1C LEVEL LT 7.0%: CPT | Mod: CPTII,95,,

## 2024-11-04 PROCEDURE — 1160F RVW MEDS BY RX/DR IN RCRD: CPT | Mod: CPTII,95,,

## 2024-11-04 PROCEDURE — 1159F MED LIST DOCD IN RCRD: CPT | Mod: CPTII,95,,

## 2024-11-04 RX ORDER — AMOXICILLIN AND CLAVULANATE POTASSIUM 875; 125 MG/1; MG/1
1 TABLET, FILM COATED ORAL EVERY 12 HOURS
Qty: 14 TABLET | Refills: 0 | Status: SHIPPED | OUTPATIENT
Start: 2024-11-04 | End: 2024-11-11

## 2024-11-04 RX ORDER — METHYLPREDNISOLONE 4 MG/1
TABLET ORAL
Qty: 21 EACH | Refills: 0 | Status: SHIPPED | OUTPATIENT
Start: 2024-11-04 | End: 2024-11-25

## 2024-11-04 RX ORDER — CETIRIZINE HYDROCHLORIDE 10 MG/1
TABLET ORAL
Qty: 90 TABLET | Refills: 3 | Status: SHIPPED | OUTPATIENT
Start: 2024-11-04

## 2024-11-04 RX ORDER — FLUTICASONE PROPIONATE 50 MCG
SPRAY, SUSPENSION (ML) NASAL
Qty: 16 ML | Refills: 11 | Status: SHIPPED | OUTPATIENT
Start: 2024-11-04

## 2024-11-04 RX ORDER — DEXBROMPHENIRAMINE MALEATE, PHENYLEPHRINE HYDROCHLORIDE 2; 7.5 MG/1; MG/1
1 TABLET ORAL EVERY 6 HOURS PRN
Qty: 24 TABLET | Refills: 0 | Status: SHIPPED | OUTPATIENT
Start: 2024-11-04

## 2024-11-04 NOTE — PROGRESS NOTES
The patient location is: [place]  The chief complaint leading to consultation is: URI and allergies    Visit type: audiovisual    Face to Face time with patient: 10 minutes  15 minutes of total time spent on the encounter, which includes face to face time and non-face to face time preparing to see the patient (eg, review of tests), Obtaining and/or reviewing separately obtained history, Documenting clinical information in the electronic or other health record, Independently interpreting results (not separately reported) and communicating results to the patient/family/caregiver, or Care coordination (not separately reported).     Each patient to whom he or she provides medical services by telemedicine is:  (1) informed of the relationship between the physician and patient and the respective role of any other health care provider with respect to management of the patient; and (2) notified that he or she may decline to receive medical services by telemedicine and may withdraw from such care at any time.    HPI:  Pt presents video audiovisual visit for c/o URI/allergy symptoms and fatigue on and off past two months since COVID. Reports 5 days ago she started with sinus congestion, purulent rhinorrhea, sinus pressure, and fevers. Reports it feels like a sinus infection. Recurrent sinus issues started shortly after moving into her current apartment which smoker lived in before them. Hx of cigarette smoke allergy.     ROS:   Answers submitted by the patient for this visit:  Review of Systems Questionnaire (Submitted on 11/4/2024)  activity change: Yes  unexpected weight change: Yes  neck pain: Yes  hearing loss: No  rhinorrhea: Yes  trouble swallowing: Yes  eye discharge: No  visual disturbance: Yes  chest tightness: No  wheezing: No  chest pain: Yes  palpitations: No  blood in stool: No  constipation: No  vomiting: Yes  diarrhea: No  polydipsia: No  polyuria: No  difficulty urinating: No  hematuria: No  menstrual problem:  No  dysuria: No  joint swelling: No  arthralgias: No  headaches: Yes  weakness: Yes  confusion: No  dysphoric mood: Yes    Physical exam:  General:  AOx3, well nourished and developed in no acute distress  Eyes:  PERRLA, EOMI, vision intact grossly  Neck:  trachea midline with no masses or thyromegaly  Respiratory: Normal respiratory effort. Appears congested.   Musculoskeletal:  Normal posture.  Normal muscular development.  Neurological:  CN II-XII grossly intact based off of video interaction  Psych:  Normal mood and affect.  Able to demonstrate good judgement and personal insight.    Assessment/Plan:    Acute bacterial sinusitis  -     amoxicillin-clavulanate 875-125mg (AUGMENTIN) 875-125 mg per tablet; Take 1 tablet by mouth every 12 (twelve) hours. for 7 days  Dispense: 14 tablet; Refill: 0  -     methylPREDNISolone (MEDROL DOSEPACK) 4 mg tablet; use as directed  Dispense: 21 each; Refill: 0  -     dexbrompheniramine-phenyleph (ALAHIST PE) 2-7.5 mg Tab; Take 1 tablet by mouth every 6 (six) hours as needed (congestion).  Dispense: 24 tablet; Refill: 0    Non-seasonal allergic rhinitis due to other allergic trigger  -     fluticasone propionate (FLONASE) 50 mcg/actuation nasal spray; 2 sprays in each nostril daily for allergies  Dispense: 16 mL; Refill: 11  -     cetirizine (ZYRTEC) 10 MG tablet; 1 tab by mouth daily for allergies  Dispense: 90 tablet; Refill: 3    Post viral syndrome         - Discussed possibility of long COVID/post viral syndrome and management.     - Recommended air purifier for home to help improve air quality at home. To start zyrtec and flonase for allergies. Hold zyrtec with use of alahist.     - To begin abx, medrol. Increase fluids. Rest. Cough, congestion, expectorant meds prn. Warm salt water gargles. Change toothbrush in 72 hours. Saline spray prn. Humidifier/steam for expectoration.    - Follow up with PCP as scheduled.     - RTC prn not improving or worsening. To ED for emergent  s/s. Meds and adverse effects discussed.         RODNEY AshtonP

## 2025-01-09 ENCOUNTER — PATIENT MESSAGE (OUTPATIENT)
Dept: ADMINISTRATIVE | Facility: HOSPITAL | Age: 60
End: 2025-01-09
Payer: COMMERCIAL

## 2025-07-31 DIAGNOSIS — Z12.31 OTHER SCREENING MAMMOGRAM: ICD-10-CM

## 2025-08-13 ENCOUNTER — PATIENT MESSAGE (OUTPATIENT)
Dept: ADMINISTRATIVE | Facility: HOSPITAL | Age: 60
End: 2025-08-13
Payer: COMMERCIAL

## (undated) DEVICE — COVER EQUIP 36X12 W/ELSTC BAND

## (undated) DEVICE — GLOVE SENSICARE PI GRN 7

## (undated) DEVICE — DRAPE T EXTRM SURG 121X128X90

## (undated) DEVICE — GLOVE SENSICARE PI SURG 7

## (undated) DEVICE — SOL 9P NACL IRR PIC IL

## (undated) DEVICE — SUT MONOCRYL 4-0 PS-2

## (undated) DEVICE — DRAPE STERI INSTRUMENT 1018

## (undated) DEVICE — BANDAGE ROLL COTTN 4.5INX4.1YD

## (undated) DEVICE — GLOVE SENSICARE PI SURG 6.5

## (undated) DEVICE — SYR B-D DISP CONTROL 10CC100/C

## (undated) DEVICE — BANDAGE ESMARK ELASTIC ST 4X9

## (undated) DEVICE — DRESSING N ADH OIL EMUL 3X3

## (undated) DEVICE — NDL ECLIPSE SAF REG 25GX1.5IN

## (undated) DEVICE — Device

## (undated) DEVICE — TOURNIQUET 1 PORT YELLOW24X4IN

## (undated) DEVICE — CANISTER SUCTION RIGID 3000CC

## (undated) DEVICE — NDL 18GA

## (undated) DEVICE — PAD ALCOHOL PREP LG STRL 2PLY

## (undated) DEVICE — SPONGE COTTON TRAY 4X4IN

## (undated) DEVICE — SLEEVE SCD EXPRESS CALF MEDIUM

## (undated) DEVICE — BANDAGE MATRIX HK LOOP 4IN 5YD

## (undated) DEVICE — BLADE SURG #15 CARBON STEEL

## (undated) DEVICE — PAD ABDOMINAL STERILE 8X10IN

## (undated) DEVICE — GLOVE BIOGEL PI ORTHO PRO 7.5